# Patient Record
Sex: MALE | Race: OTHER | HISPANIC OR LATINO | Employment: UNEMPLOYED | URBAN - METROPOLITAN AREA
[De-identification: names, ages, dates, MRNs, and addresses within clinical notes are randomized per-mention and may not be internally consistent; named-entity substitution may affect disease eponyms.]

---

## 2017-12-01 ENCOUNTER — APPOINTMENT (EMERGENCY)
Dept: RADIOLOGY | Facility: HOSPITAL | Age: 57
DRG: 872 | End: 2017-12-01
Payer: COMMERCIAL

## 2017-12-01 ENCOUNTER — HOSPITAL ENCOUNTER (INPATIENT)
Facility: HOSPITAL | Age: 57
LOS: 3 days | Discharge: HOME | DRG: 872 | End: 2017-12-04
Attending: EMERGENCY MEDICINE | Admitting: INTERNAL MEDICINE
Payer: COMMERCIAL

## 2017-12-01 ENCOUNTER — APPOINTMENT (INPATIENT)
Dept: RADIOLOGY | Facility: HOSPITAL | Age: 57
DRG: 872 | End: 2017-12-01
Payer: COMMERCIAL

## 2017-12-01 DIAGNOSIS — Z72.0 TOBACCO ABUSE: ICD-10-CM

## 2017-12-01 DIAGNOSIS — A41.9 SEPSIS (HCC): ICD-10-CM

## 2017-12-01 DIAGNOSIS — R10.9 ABDOMINAL PAIN: ICD-10-CM

## 2017-12-01 DIAGNOSIS — K81.9 CHOLECYSTITIS: Primary | ICD-10-CM

## 2017-12-01 PROBLEM — D64.9 ANEMIA: Status: ACTIVE | Noted: 2017-12-01

## 2017-12-01 PROBLEM — C18.9 COLON CANCER METASTASIZED TO LIVER (HCC): Status: ACTIVE | Noted: 2017-12-01

## 2017-12-01 PROBLEM — C78.7 COLON CANCER METASTASIZED TO LIVER (HCC): Status: ACTIVE | Noted: 2017-12-01

## 2017-12-01 PROBLEM — D72.829 LEUKOCYTOSIS: Status: ACTIVE | Noted: 2017-12-01

## 2017-12-01 LAB
ALBUMIN SERPL BCP-MCNC: 2.7 G/DL (ref 3.5–5)
ALP SERPL-CCNC: 390 U/L (ref 46–116)
ALT SERPL W P-5'-P-CCNC: 18 U/L (ref 12–78)
ANION GAP BLD CALC-SCNC: 17 MMOL/L (ref 4–13)
ANION GAP SERPL CALCULATED.3IONS-SCNC: 8 MMOL/L (ref 4–13)
APTT PPP: 47 SECONDS (ref 23–35)
AST SERPL W P-5'-P-CCNC: 81 U/L (ref 5–45)
BACTERIA UR QL AUTO: NORMAL /HPF
BASOPHILS # BLD AUTO: 0.03 THOUSANDS/ΜL (ref 0–0.1)
BASOPHILS NFR BLD AUTO: 0 % (ref 0–1)
BILIRUB SERPL-MCNC: 0.98 MG/DL (ref 0.2–1)
BILIRUB UR QL STRIP: ABNORMAL
BUN BLD-MCNC: 5 MG/DL (ref 5–25)
BUN SERPL-MCNC: 7 MG/DL (ref 5–25)
CA-I BLD-SCNC: 1.08 MMOL/L (ref 1.12–1.32)
CALCIUM SERPL-MCNC: 9 MG/DL (ref 8.3–10.1)
CHLORIDE BLD-SCNC: 98 MMOL/L (ref 100–108)
CHLORIDE SERPL-SCNC: 100 MMOL/L (ref 100–108)
CLARITY UR: CLEAR
CO2 SERPL-SCNC: 25 MMOL/L (ref 21–32)
COLOR UR: YELLOW
CREAT BLD-MCNC: 0.6 MG/DL (ref 0.6–1.3)
CREAT SERPL-MCNC: 0.7 MG/DL (ref 0.6–1.3)
EOSINOPHIL # BLD AUTO: 0.16 THOUSAND/ΜL (ref 0–0.61)
EOSINOPHIL NFR BLD AUTO: 1 % (ref 0–6)
ERYTHROCYTE [DISTWIDTH] IN BLOOD BY AUTOMATED COUNT: 19.8 % (ref 11.6–15.1)
GFR SERPL CREATININE-BSD FRML MDRD: 105 ML/MIN/1.73SQ M
GFR SERPL CREATININE-BSD FRML MDRD: 112 ML/MIN/1.73SQ M
GGT SERPL-CCNC: 1201 U/L (ref 5–85)
GLUCOSE SERPL-MCNC: 92 MG/DL (ref 65–140)
GLUCOSE SERPL-MCNC: 98 MG/DL (ref 65–140)
GLUCOSE UR STRIP-MCNC: NEGATIVE MG/DL
HCT VFR BLD AUTO: 28.4 % (ref 36.5–49.3)
HCT VFR BLD CALC: 30 % (ref 36.5–49.3)
HGB BLD-MCNC: 8.7 G/DL (ref 12–17)
HGB BLDA-MCNC: 10.2 G/DL (ref 12–17)
HGB UR QL STRIP.AUTO: NEGATIVE
HYALINE CASTS #/AREA URNS LPF: NORMAL /LPF
INR PPP: 1.29 (ref 0.86–1.16)
KETONES UR STRIP-MCNC: NEGATIVE MG/DL
LACTATE SERPL-SCNC: 1.9 MMOL/L (ref 0.5–2)
LEUKOCYTE ESTERASE UR QL STRIP: NEGATIVE
LYMPHOCYTES # BLD AUTO: 1.12 THOUSANDS/ΜL (ref 0.6–4.47)
LYMPHOCYTES NFR BLD AUTO: 6 % (ref 14–44)
MCH RBC QN AUTO: 23.3 PG (ref 26.8–34.3)
MCHC RBC AUTO-ENTMCNC: 30.6 G/DL (ref 31.4–37.4)
MCV RBC AUTO: 76 FL (ref 82–98)
MONOCYTES # BLD AUTO: 2.24 THOUSAND/ΜL (ref 0.17–1.22)
MONOCYTES NFR BLD AUTO: 12 % (ref 4–12)
NEUTROPHILS # BLD AUTO: 15.9 THOUSANDS/ΜL (ref 1.85–7.62)
NEUTS SEG NFR BLD AUTO: 81 % (ref 43–75)
NITRITE UR QL STRIP: NEGATIVE
NON-SQ EPI CELLS URNS QL MICRO: NORMAL /HPF
NRBC BLD AUTO-RTO: 0 /100 WBCS
PCO2 BLD: 25 MMOL/L (ref 21–32)
PH UR STRIP.AUTO: 6.5 [PH] (ref 4.5–8)
PLATELET # BLD AUTO: 222 THOUSANDS/UL (ref 149–390)
PMV BLD AUTO: 9.5 FL (ref 8.9–12.7)
POTASSIUM BLD-SCNC: 3.7 MMOL/L (ref 3.5–5.3)
POTASSIUM SERPL-SCNC: 3.7 MMOL/L (ref 3.5–5.3)
PROT SERPL-MCNC: 7.9 G/DL (ref 6.4–8.2)
PROT UR STRIP-MCNC: ABNORMAL MG/DL
PROTHROMBIN TIME: 16.2 SECONDS (ref 12.1–14.4)
RBC # BLD AUTO: 3.74 MILLION/UL (ref 3.88–5.62)
RBC #/AREA URNS AUTO: NORMAL /HPF
SODIUM BLD-SCNC: 135 MMOL/L (ref 136–145)
SODIUM SERPL-SCNC: 133 MMOL/L (ref 136–145)
SP GR UR STRIP.AUTO: 1.01 (ref 1–1.03)
SPECIMEN SOURCE: ABNORMAL
SPECIMEN SOURCE: NORMAL
TROPONIN I BLD-MCNC: 0 NG/ML (ref 0–0.08)
UROBILINOGEN UR QL STRIP.AUTO: 2 E.U./DL
WBC # BLD AUTO: 19.5 THOUSAND/UL (ref 4.31–10.16)
WBC #/AREA URNS AUTO: NORMAL /HPF

## 2017-12-01 PROCEDURE — 96375 TX/PRO/DX INJ NEW DRUG ADDON: CPT

## 2017-12-01 PROCEDURE — 80047 BASIC METABLC PNL IONIZED CA: CPT

## 2017-12-01 PROCEDURE — 36415 COLL VENOUS BLD VENIPUNCTURE: CPT | Performed by: EMERGENCY MEDICINE

## 2017-12-01 PROCEDURE — 81002 URINALYSIS NONAUTO W/O SCOPE: CPT | Performed by: EMERGENCY MEDICINE

## 2017-12-01 PROCEDURE — 80053 COMPREHEN METABOLIC PANEL: CPT | Performed by: EMERGENCY MEDICINE

## 2017-12-01 PROCEDURE — 85025 COMPLETE CBC W/AUTO DIFF WBC: CPT | Performed by: EMERGENCY MEDICINE

## 2017-12-01 PROCEDURE — 76705 ECHO EXAM OF ABDOMEN: CPT

## 2017-12-01 PROCEDURE — 93005 ELECTROCARDIOGRAM TRACING: CPT | Performed by: EMERGENCY MEDICINE

## 2017-12-01 PROCEDURE — 82977 ASSAY OF GGT: CPT | Performed by: INTERNAL MEDICINE

## 2017-12-01 PROCEDURE — 96365 THER/PROPH/DIAG IV INF INIT: CPT

## 2017-12-01 PROCEDURE — 87040 BLOOD CULTURE FOR BACTERIA: CPT | Performed by: EMERGENCY MEDICINE

## 2017-12-01 PROCEDURE — 96367 TX/PROPH/DG ADDL SEQ IV INF: CPT

## 2017-12-01 PROCEDURE — 84484 ASSAY OF TROPONIN QUANT: CPT

## 2017-12-01 PROCEDURE — 96361 HYDRATE IV INFUSION ADD-ON: CPT

## 2017-12-01 PROCEDURE — 71020 HB CHEST X-RAY 2VW FRONTAL&LATL: CPT

## 2017-12-01 PROCEDURE — 85730 THROMBOPLASTIN TIME PARTIAL: CPT | Performed by: EMERGENCY MEDICINE

## 2017-12-01 PROCEDURE — 83605 ASSAY OF LACTIC ACID: CPT | Performed by: EMERGENCY MEDICINE

## 2017-12-01 PROCEDURE — 85610 PROTHROMBIN TIME: CPT | Performed by: EMERGENCY MEDICINE

## 2017-12-01 PROCEDURE — 74177 CT ABD & PELVIS W/CONTRAST: CPT

## 2017-12-01 PROCEDURE — 99285 EMERGENCY DEPT VISIT HI MDM: CPT

## 2017-12-01 PROCEDURE — 81001 URINALYSIS AUTO W/SCOPE: CPT

## 2017-12-01 PROCEDURE — 85014 HEMATOCRIT: CPT

## 2017-12-01 RX ORDER — ACETAMINOPHEN 325 MG/1
650 TABLET ORAL ONCE
Status: COMPLETED | OUTPATIENT
Start: 2017-12-01 | End: 2017-12-01

## 2017-12-01 RX ORDER — ACETAMINOPHEN 325 MG/1
975 TABLET ORAL EVERY 6 HOURS PRN
Status: DISCONTINUED | OUTPATIENT
Start: 2017-12-01 | End: 2017-12-04 | Stop reason: HOSPADM

## 2017-12-01 RX ORDER — NICOTINE 21 MG/24HR
1 PATCH, TRANSDERMAL 24 HOURS TRANSDERMAL DAILY
Status: DISCONTINUED | OUTPATIENT
Start: 2017-12-01 | End: 2017-12-04 | Stop reason: HOSPADM

## 2017-12-01 RX ORDER — OXYCODONE HYDROCHLORIDE AND ACETAMINOPHEN 5; 325 MG/1; MG/1
1 TABLET ORAL EVERY 4 HOURS PRN
COMMUNITY
End: 2017-12-04 | Stop reason: HOSPADM

## 2017-12-01 RX ORDER — OXYCODONE HYDROCHLORIDE 10 MG/1
10 TABLET ORAL EVERY 4 HOURS PRN
Status: DISCONTINUED | OUTPATIENT
Start: 2017-12-01 | End: 2017-12-04 | Stop reason: HOSPADM

## 2017-12-01 RX ORDER — BACLOFEN 20 MG/1
10 TABLET ORAL 3 TIMES DAILY
Status: ON HOLD | COMMUNITY
End: 2017-12-04

## 2017-12-01 RX ORDER — LEVOTHYROXINE SODIUM 0.1 MG/1
100 TABLET ORAL
Status: DISCONTINUED | OUTPATIENT
Start: 2017-12-02 | End: 2017-12-04 | Stop reason: HOSPADM

## 2017-12-01 RX ORDER — ACETAMINOPHEN 650 MG/1
650 SUPPOSITORY RECTAL ONCE
Status: DISCONTINUED | OUTPATIENT
Start: 2017-12-01 | End: 2017-12-01

## 2017-12-01 RX ORDER — VANCOMYCIN HYDROCHLORIDE 1 G/200ML
15 INJECTION, SOLUTION INTRAVENOUS ONCE
Status: COMPLETED | OUTPATIENT
Start: 2017-12-01 | End: 2017-12-01

## 2017-12-01 RX ORDER — BACLOFEN 10 MG/1
10 TABLET ORAL 3 TIMES DAILY
Status: DISCONTINUED | OUTPATIENT
Start: 2017-12-01 | End: 2017-12-04 | Stop reason: HOSPADM

## 2017-12-01 RX ORDER — ONDANSETRON 2 MG/ML
4 INJECTION INTRAMUSCULAR; INTRAVENOUS EVERY 4 HOURS PRN
Status: DISCONTINUED | OUTPATIENT
Start: 2017-12-01 | End: 2017-12-04 | Stop reason: HOSPADM

## 2017-12-01 RX ORDER — FAMOTIDINE 20 MG/1
20 TABLET, FILM COATED ORAL 2 TIMES DAILY
COMMUNITY
End: 2017-12-04 | Stop reason: HOSPADM

## 2017-12-01 RX ORDER — LEVOTHYROXINE SODIUM 0.1 MG/1
100 TABLET ORAL DAILY
Status: ON HOLD | COMMUNITY
End: 2017-12-04

## 2017-12-01 RX ORDER — ONDANSETRON 2 MG/ML
4 INJECTION INTRAMUSCULAR; INTRAVENOUS ONCE
Status: COMPLETED | OUTPATIENT
Start: 2017-12-01 | End: 2017-12-01

## 2017-12-01 RX ORDER — OMEPRAZOLE 20 MG/1
20 CAPSULE, DELAYED RELEASE ORAL DAILY
Status: ON HOLD | COMMUNITY
End: 2017-12-04

## 2017-12-01 RX ORDER — SODIUM CHLORIDE 9 MG/ML
100 INJECTION, SOLUTION INTRAVENOUS CONTINUOUS
Status: DISCONTINUED | OUTPATIENT
Start: 2017-12-01 | End: 2017-12-04

## 2017-12-01 RX ADMIN — METRONIDAZOLE 500 MG: 500 INJECTION, SOLUTION INTRAVENOUS at 13:18

## 2017-12-01 RX ADMIN — ENOXAPARIN SODIUM 40 MG: 40 INJECTION SUBCUTANEOUS at 16:50

## 2017-12-01 RX ADMIN — ONDANSETRON 4 MG: 2 INJECTION INTRAMUSCULAR; INTRAVENOUS at 11:21

## 2017-12-01 RX ADMIN — ACETAMINOPHEN 650 MG: 325 TABLET, FILM COATED ORAL at 11:27

## 2017-12-01 RX ADMIN — METRONIDAZOLE 500 MG: 500 INJECTION, SOLUTION INTRAVENOUS at 20:00

## 2017-12-01 RX ADMIN — SODIUM CHLORIDE 1000 ML: 0.9 INJECTION, SOLUTION INTRAVENOUS at 11:20

## 2017-12-01 RX ADMIN — HYDROMORPHONE HYDROCHLORIDE 0.5 MG: 1 INJECTION, SOLUTION INTRAMUSCULAR; INTRAVENOUS; SUBCUTANEOUS at 21:27

## 2017-12-01 RX ADMIN — SODIUM CHLORIDE 100 ML/HR: 0.9 INJECTION, SOLUTION INTRAVENOUS at 16:45

## 2017-12-01 RX ADMIN — OXYCODONE HYDROCHLORIDE 10 MG: 10 TABLET ORAL at 19:53

## 2017-12-01 RX ADMIN — CEFEPIME HYDROCHLORIDE 2000 MG: 2 INJECTION, SOLUTION INTRAVENOUS at 13:01

## 2017-12-01 RX ADMIN — HYDROMORPHONE HYDROCHLORIDE 0.5 MG: 1 INJECTION, SOLUTION INTRAMUSCULAR; INTRAVENOUS; SUBCUTANEOUS at 15:53

## 2017-12-01 RX ADMIN — BACLOFEN 10 MG: 10 TABLET ORAL at 16:50

## 2017-12-01 RX ADMIN — SODIUM CHLORIDE 1000 ML: 0.9 INJECTION, SOLUTION INTRAVENOUS at 11:57

## 2017-12-01 RX ADMIN — HYDROMORPHONE HYDROCHLORIDE 0.5 MG: 1 INJECTION, SOLUTION INTRAMUSCULAR; INTRAVENOUS; SUBCUTANEOUS at 11:20

## 2017-12-01 RX ADMIN — IOHEXOL 100 ML: 350 INJECTION, SOLUTION INTRAVENOUS at 12:37

## 2017-12-01 RX ADMIN — NICOTINE 1 PATCH: 21 PATCH, EXTENDED RELEASE TRANSDERMAL at 16:50

## 2017-12-01 RX ADMIN — SODIUM CHLORIDE 1000 ML: 0.9 INJECTION, SOLUTION INTRAVENOUS at 16:45

## 2017-12-01 RX ADMIN — BACLOFEN 10 MG: 10 TABLET ORAL at 20:00

## 2017-12-01 RX ADMIN — VANCOMYCIN HYDROCHLORIDE 1000 MG: 1 INJECTION, SOLUTION INTRAVENOUS at 14:30

## 2017-12-01 NOTE — ED ATTENDING ATTESTATION
Candice Suarez MD, saw and evaluated the patient  I have discussed the patient with the resident/non-physician practitioner and agree with the resident's/non-physician practitioner's findings, Plan of Care, and MDM as documented in the resident's/non-physician practitioner's note, except where noted  All available labs and Radiology studies were reviewed  At this point I agree with the current assessment done in the Emergency Department  I have conducted an independent evaluation of this patient a history and physical is as follows:      Critical Care Time  CritCare Time  OA: 66 y/o m with history of colon cancer with last chemotherapy ~ 1 month ago and recently arrived to the [address] [place] [address],[address] from [place] c/o abdominal pain with n/v  + constipation x 1-2 days  No cp but with SOB and FINE over the past few days with increase in abdominal pain  No dizziness  No headache  Previous partial colectomy per daughter  PE, chronically ill appearing m febrile, tachycardic, NC/AT, mildly icteric sclera, mildly dry MM, neck supple/FROM, RR, lungs decreased at b/l bases otherwise CTAb, abd soft, + mild distension, diffusely ttp without r/g, well healed abdominal scars, trace b/l LE edema, intact distal pulses and capillary refill < 2 sec, AAOx3   A/p febrile with abd pain in setting of colon cancer, concern for worsening disease with infectious component, labs, u/a, imaging, IVF hydration, pain control and anti-emetic, treat accordingly, will require admission

## 2017-12-01 NOTE — PROGRESS NOTES
[facility] Senior Admission Note   Unit/Bed # @DBLINK (QRO,94198)@ Encounter: [id number]  [id number] [facility] 65 y o  male [id number]       Patient seen and examined  Reviewed H&P per Dr Johnson Rom  Agree with the assessment and plan  Assessment/Plan: Principal Problem:    Sepsis ([facility] )  Active Problems:    Cholecystitis    [facility]    Anemia    Leukocytosis       Disposition:  INPATIENT     Expected LOS: >2 Midnights  This will be reassessed based on clinical progress        Joshua Asif

## 2017-12-01 NOTE — SEPSIS NOTE
Sepsis Note   George De Santiago 65 y o  male MRN: [id number]  Unit/Bed#: [facility] Encounter: [id number]            Initial Sepsis Screening     Row Name 12/01/17 1202                Is the patient's history suggestive of a new or worsening infection? (!)  Yes (Proceed)  -EL        Suspected source of infection urinary tract infection;acute abdominal infection;pneumonia  -EL        Are two or more of the following signs & symptoms of infection both present and new to the patient? (!)  Yes (Proceed)  -EL        Indicate SIRS criteria Hyperthemia > 38 3C (100 9F); Tachycardia > 90 bpm;Leukocytosis (WBC > 218[address] IJL)  -EL        If the answer is yes to both questions, suspicion of sepsis is present          If severe sepsis is present AND tissue hypoperfusion perists in the hour after fluid resuscitation or lactate > 4, the patient meets criteria for SEPTIC SHOCK          Are any of the following organ dysfunction criteria present within 6 hours of suspected infection and SIRS criteria that are NOT considered to be chronic conditions? (!)  Yes  -EL        Organ dysfunction          Date of presentation of severe sepsis          Time of presentation of severe sepsis          Tissue hypoperfusion persists in the hour after crystalloid fluid administration, evidenced, by either:          Was hypotension present within one hour of the conclusion of crystalloid fluid administration?           Date of presentation of septic shock          Time of presentation of septic shock            User Key  (r) = Recorded By, (t) = Taken By, (c) = Cosigned By    [address] Name Provider Adelso Pa MD Resident

## 2017-12-01 NOTE — SEPSIS NOTE
Sepsis Note   Lindsey Ascencio 65 y o  male MRN: [id number]  Unit/Bed#: [facility] 626-01 Encounter: [id number]            Initial Sepsis Screening     Row Name 12/01/17 1202                Is the patient's history suggestive of a new or worsening infection? (!)  Yes (Proceed)  -EL        Suspected source of infection urinary tract infection;acute abdominal infection;pneumonia  -EL        Are two or more of the following signs & symptoms of infection both present and new to the patient? (!)  Yes (Proceed)  -EL        Indicate SIRS criteria Hyperthemia > 38 3C (100 9F); Tachycardia > 90 bpm;Leukocytosis (WBC > 65302 IJL)  -EL        If the answer is yes to both questions, suspicion of sepsis is present          If severe sepsis is present AND tissue hypoperfusion perists in the hour after fluid resuscitation or lactate > 4, the patient meets criteria for SEPTIC SHOCK          Are any of the following organ dysfunction criteria present within 6 hours of suspected infection and SIRS criteria that are NOT considered to be chronic conditions? (!)  Yes  -EL        Organ dysfunction          Date of presentation of severe sepsis          Time of presentation of severe sepsis          Tissue hypoperfusion persists in the hour after crystalloid fluid administration, evidenced, by either:          Was hypotension present within one hour of the conclusion of crystalloid fluid administration?           Date of presentation of septic shock          Time of presentation of septic shock            User Key  (r) = Recorded By, (t) = Taken By, (c) = Cosigned By    [address] Name Provider Type    EL Cesia Chan MD Resident               Default Flowsheet Data (last 720 hours)      Sepsis Reassessment     Row Name 12/01/17 1333                   Volume Status and Tissue Perfusion Post Fluid Resuscitation- Must Document ALL of the Following:    Vital Signs Reviewed Yes  -EL        Cardio Normal S1/S2  -EL        Pulmonary Normal effort  -EL        Capillary Refill Brisk  -EL        Peripheral Pulses Radial;Dorsalis Pedis  -EL        Peripheral Pulse +2  -EL        Dorsalis Pedis +2  -EL        Skin             *OR*   Intensive Monitoring- Must Document Two * of the Following Four *:    Vital Signs Reviewed          * Central Venous Pressure (CVP or RAP)          * Central Venous Oxygen (SVO2, ScvO2 or Oxygen saturation via central catheter)          * Bedside Cardiovascular US in IVC diameter and % collapse          * Passive Leg Raise OR Crystalloid Challenge            User Key  (r) = Recorded By, (t) = Taken By, (c) = Cosigned By    Initials Name Provider Type    RAFAELA Ramirez MD Resident

## 2017-12-01 NOTE — ED PROVIDER NOTES
History  Chief Complaint   Patient presents with    Abdominal Pain     history of colon cancer, recently arrived from [place] , hasnt had any chemo for 4 weeks, + nausea and vomiting     HPI  81-year-old male past history metastatic colon cancer status post partial colectomy with collect colostomy bags with reanastomosis, on chemotherapy but off of it for the last 3 weeks presents with nausea, vomiting, fevers/chills and diffuse abdominal pain  Patient says he has been off his chemotherapy for the last 3 weeks because he recently emigrated from [facility] and has not followed up with any doctors yet  He began last night with sudden onset diffuse abdominal pain as well as nausea, vomiting and subjective fevers  Patient is passing flatus at this time but has not had a bowel movement in the last day  He has not taken anything for symptoms  He denies any other abdominal surgeries in the past   Patient otherwise denies chest pain, shortness of breath, dysuria, weakness, numbness, tingling, speech difficulties  He is full code  Impression/plan 81-year-old male history of metastatic colon cancer on chemo but has not been treated in 3 weeks presents with fevers, chills, diffuse abdominal pain as well as nausea and vomiting  He is febrile as well as tachycardic  Concern for sepsis  We will do a septic workup, get a CT of the abdomen pelvis, treat his symptoms with IV fluids, Tylenol, Zofran, Dilaudid  Will also empirically treated with vancomycin, cefepime, Flagyl  Prior to Admission Medications   Prescriptions Last Dose Informant Patient Reported?  Taking?   baclofen 20 mg tablet Unknown at Unknown time  Yes No   Sig: Take 10 mg by mouth 3 (three) times a day   famotidine (PEPCID) 20 mg tablet Unknown at Unknown time  Yes No   Sig: Take 20 mg by mouth 2 (two) times a day   levothyroxine 100 mcg tablet Unknown at Unknown time  Yes No   Sig: Take 100 mcg by mouth daily   omeprazole (PriLOSEC) 20 mg delayed release capsule Unknown at Unknown time  Yes No   Sig: Take 20 mg by mouth daily   oxyCODONE-acetaminophen (PERCOCET) 5-325 mg per tablet Unknown at Unknown time  Yes No   Sig: Take 1 tablet by mouth every 4 (four) hours as needed for moderate pain      Facility-Administered Medications: None       Past Medical History:   Diagnosis Date    [facility])     Colon cancer metastasized to liver ([address] )     Disease of thyroid gland        Past Surgical History:   Procedure Laterality Date    COLECTOMY BRET         Family History   Problem Relation Age of Onset    Hypertension Mother      I have reviewed and agree with the history as documented  Social History   Substance Use Topics    Smoking status: Current Every Day Smoker     Packs/day: 1 00     Years: 30 00     Types: Cigarettes    Smokeless tobacco: Never Used    Alcohol use No        Review of Systems   Constitutional: Positive for activity change, appetite change, chills and fever  Negative for diaphoresis and fatigue  HENT: Negative for trouble swallowing and voice change  Eyes: Negative for photophobia  Respiratory: Negative for shortness of breath  Cardiovascular: Negative for chest pain, palpitations and leg swelling  Gastrointestinal: Positive for abdominal pain, constipation and nausea  Negative for diarrhea and vomiting  Endocrine: Negative for polyuria  Genitourinary: Negative for dysuria  Musculoskeletal: Negative for back pain  Skin: Negative for rash  Allergic/Immunologic: Negative  Neurological: Negative for dizziness, tremors, seizures, syncope, facial asymmetry, speech difficulty, weakness, light-headedness, numbness and headaches  Hematological: Negative for adenopathy  Does not bruise/bleed easily  Psychiatric/Behavioral: Negative for agitation         Physical Exam  ED Triage Vitals   Temperature Pulse Respirations Blood Pressure SpO2   12/01/17 1026 12/01/17 1026 12/01/17 1026 12/01/17 1026 12/01/17 1026   99 8 °F (37 7 °C) (!) 124 20 114/62 99 %      Temp Source Heart Rate Source Patient Position - Orthostatic VS BP Location FiO2 (%)   12/01/17 1026 12/01/17 1146 12/01/17 1146 -- --   Oral Monitor Lying        Pain Score       12/01/17 1026       8           Orthostatic Vital Signs  Vitals:    12/01/17 1300 12/01/17 1400 12/01/17 1555 12/01/17 1730   BP: 124/68 119/69 134/73 138/83   Pulse: (!) 111 105 101 (!) 106   Patient Position - Orthostatic VS: Lying Lying  Lying       Physical Exam   Constitutional: He is oriented to person, place, and time  He appears well-developed and well-nourished  No distress  HENT:   Head: Normocephalic and atraumatic  Right Ear: No hemotympanum  Left Ear: No hemotympanum  Nose: No nasal septal hematoma  Mouth/Throat: Uvula is midline and oropharynx is clear and moist  No oropharyngeal exudate  Eyes: EOM are normal  Pupils are equal, round, and reactive to light  Right eye exhibits no discharge  Left eye exhibits no discharge  Neck: Normal range of motion  Neck supple  No JVD present  No tracheal deviation present  Cardiovascular: Normal rate, regular rhythm, normal heart sounds and intact distal pulses  Exam reveals no gallop and no friction rub  No murmur heard  Pulmonary/Chest: Effort normal and breath sounds normal  No stridor  No respiratory distress  He has no wheezes  He has no rales  He exhibits no tenderness  Abdominal: Soft  Bowel sounds are normal  He exhibits no distension and no mass  There is tenderness (diffuse)  There is no rebound and no guarding  No hernia  Musculoskeletal: Normal range of motion  He exhibits no edema  Lymphadenopathy:     He has no cervical adenopathy  Neurological: He is alert and oriented to person, place, and time  He has normal strength and normal reflexes  He is not disoriented  No cranial nerve deficit or sensory deficit  GCS eye subscore is 4  GCS verbal subscore is 5  GCS motor subscore is 6     Reflex Scores: Patellar reflexes are 2+ on the right side and 2+ on the left side  Achilles reflexes are 2+ on the right side and 2+ on the left side  Cn 2-12 grossly intact  No pronator drift  Normal gait  Normal strength/sensation   Skin: Skin is warm and dry  Capillary refill takes less than 2 seconds  No rash noted  He is not diaphoretic  No erythema  No pallor  Psychiatric: He has a normal mood and affect  Nursing note and vitals reviewed        ED Medications  Medications   HYDROmorphone (DILAUDID) 1 mg/mL injection 0 5 mg (0 5 mg Intravenous Given 12/1/17 1553)   levothyroxine tablet 100 mcg (not administered)   baclofen tablet 10 mg (10 mg Oral Given 12/1/17 1650)   nicotine (NICODERM CQ) 21 mg/24 hr TD 24 hr patch 1 patch (1 patch Transdermal Medication Applied 12/1/17 1650)   enoxaparin (LOVENOX) subcutaneous injection 40 mg (40 mg Subcutaneous Given 12/1/17 1650)   ondansetron (ZOFRAN) injection 4 mg (not administered)   oxyCODONE (ROXICODONE) immediate release tablet 10 mg (10 mg Oral Given 12/1/17 1953)   acetaminophen (TYLENOL) tablet 975 mg (not administered)   cefepime (MAXIPIME) 2,000 mg in dextrose 5 % 50 mL IVPB (not administered)   metroNIDAZOLE (FLAGYL) IVPB (premix) 500 mg (not administered)   sodium chloride 0 9 % infusion (100 mL/hr Intravenous New Bag 12/1/17 1645)   sodium chloride 0 9 % bolus 1,000 mL (0 mL Intravenous Stopped 12/1/17 1157)   HYDROmorphone (DILAUDID) 1 mg/mL injection 0 5 mg (0 5 mg Intravenous Given 12/1/17 1120)   ondansetron (ZOFRAN) injection 4 mg (4 mg Intravenous Given 12/1/17 1121)   acetaminophen (TYLENOL) tablet 650 mg (650 mg Oral Given 12/1/17 1127)   metroNIDAZOLE (FLAGYL) IVPB (premix) 500 mg (0 mg Intravenous Stopped 12/1/17 1430)   vancomycin (VANCOCIN) IVPB (premix) 1,000 mg (0 mg/kg × 60 8 kg Intravenous Stopped 12/1/17 1550)   cefepime (MAXIPIME) IVPB (premix) 2,000 mg (0 mg Intravenous Stopped 12/1/17 2903)   sodium chloride 0 9 % bolus 1,000 mL (0 mL Intravenous Stopped 12/1/17 1300)   iohexol (OMNIPAQUE) 350 MG/ML injection (MULTI-DOSE) 100 mL (100 mL Intravenous Given 12/1/17 1237)   sodium chloride 0 9 % bolus 1,000 mL (0 mL Intravenous Stopped 12/1/17 1923)       Diagnostic Studies  Results Reviewed     Procedure Component Value Units Date/Time    Gamma GT [[id number]]  (Abnormal) Collected:  12/01/17 1109    Lab Status:  Final result Specimen:  Blood from Arm, Left Updated:  12/01/17 1639     GGT 1,201 (H) U/L     Urine Microscopic [[id number]]  (Normal) Collected:  12/01/17 1151    Lab Status:  Final result Specimen:  Urine from Urine, Clean Catch Updated:  12/01/17 1205     RBC, UA None Seen /hpf      WBC, UA None Seen /hpf      Epithelial Cells None Seen /hpf      Bacteria, UA None Seen /hpf      Hyaline Casts, UA None Seen /lpf     POCT urinalysis dipstick [[id number]]  (Abnormal) Resulted:  12/01/17 1156    Lab Status:  Final result Specimen:  Urine Updated:  12/01/17 1156    ED Urine Macroscopic [[id number]]  (Abnormal) Collected:  12/01/17 1151    Lab Status:  Final result Specimen:  Urine Updated:  12/01/17 1153     Color, UA Yellow     Clarity, UA Clear     pH, UA 6 5     Leukocytes, UA Negative     Nitrite, UA Negative     Protein, UA 30 (1+) (A) mg/dl      Glucose, UA Negative mg/dl      Ketones, UA Negative mg/dl      Urobilinogen, UA 2 0 (A) E U /dl      Bilirubin, UA Interference- unable to analyze (A)     Blood, UA Negative     Specific Gravity, UA 1 015    Narrative:       CLINITEK RESULT    Blood culture #1 [[id number]] Collected:  12/01/17 1141    Lab Status: In process Specimen:  Blood from Arm, Right Updated:  12/01/17 1147    Lactic Acid x2 [[id number]]  (Normal) Collected:  12/01/17 1109    Lab Status:  Final result Specimen:  Blood from Arm, Left Updated:  12/01/17 1145     LACTIC ACID 1 9 mmol/L     Narrative:         Result may be elevated if tourniquet was used during collection      Comprehensive metabolic panel [[id number]]  (Abnormal) Collected:  12/01/17 1109    Lab Status:  Final result Specimen:  Blood from Arm, Left Updated:  12/01/17 1142     Sodium 133 (L) mmol/L      Potassium 3 7 mmol/L      Chloride 100 mmol/L      CO2 25 mmol/L      Anion Gap 8 mmol/L      BUN 7 mg/dL      Creatinine 0 70 mg/dL      Glucose 92 mg/dL      Calcium 9 0 mg/dL      AST 81 (H) U/L      ALT 18 U/L      Alkaline Phosphatase 390 (H) U/L      Total Protein 7 9 g/dL      Albumin 2 7 (L) g/dL      Total Bilirubin 0 98 mg/dL      eGFR 105 ml/min/1 73sq m     Narrative:         National Kidney Disease Education Program recommendations are as follows:  GFR calculation is accurate only with a steady state creatinine  Chronic Kidney disease less than 60 ml/min/1 73 sq  meters  Kidney failure less than 15 ml/min/1 73 sq  meters  APTT [[id number]]  (Abnormal) Collected:  12/01/17 1109    Lab Status:  Final result Specimen:  Blood from Arm, Left Updated:  12/01/17 1140     PTT 47 (H) seconds     Narrative: Therapeutic Heparin Range = 60-90 seconds    Protime-INR [[id number]]  (Abnormal) Collected:  12/01/17 1109    Lab Status:  Final result Specimen:  Blood from Arm, Left Updated:  12/01/17 1140     Protime 16 2 (H) seconds      INR 1 29 (H)    POCT troponin [[id number]]  (Normal) Collected:  12/01/17 1120    Lab Status:  Final result Updated:  12/01/17 1134     POC Troponin I 0 00 ng/ml      Specimen Type VENOUS    Narrative:         [employer] i-Stat handheld analyzer 99% cutoff is > 0 08ng/mL in [facility]    o cTnI 99% cutoff is useful only when applied to patients in the clinical setting of myocardial ischemia  o cTnI 99% cutoff should be interpreted in the context of clinical history, ECG findings and possibly cardiac imaging to establish correct diagnosis  o cTnI 99% cutoff may be suggestive but clearly not indicative of a coronary event without the clinical setting of myocardial ischemia      POCT Chem 8+ [[id number]]  (Abnormal) Collected: 12/01/17 1123    Lab Status:  Final result Updated:  12/01/17 1128     SODIUM, I-STAT 135 (L) mmol/l      Potassium, i-STAT 3 7 mmol/L      Chloride, istat 98 (L) mmol/L      CO2, i-STAT 25 mmol/L      Anion Gap, Istat 17 (H) mmol/L      Calcium, Ionized i-STAT 1 08 (L) mmol/L      BUN, I-STAT 5 mg/dl      Creatinine, i-STAT 0 6 mg/dl      eGFR 112 ml/min/1 73sq m      Glucose, i-STAT 98 mg/dl      Hct, i-STAT 30 (L) %      Hgb, i-STAT 10 2 (L) g/dl      Specimen Type VENOUS    CBC and differential [[id number]]  (Abnormal) Collected:  12/01/17 1109    Lab Status:  Final result Specimen:  Blood from Arm, Left Updated:  12/01/17 1123     WBC 19 50 (H) Thousand/uL      RBC 3 74 (L) Million/uL      Hemoglobin 8 7 (L) g/dL      Hematocrit 28 4 (L) %      MCV 76 (L) fL      MCH 23 3 (L) pg      MCHC 30 6 (L) g/dL      RDW 19 8 (H) %      MPV 9 5 fL      Platelets 783 Thousands/uL      nRBC 0 /100 WBCs      Neutrophils Relative 81 (H) %      Lymphocytes Relative 6 (L) %      Monocytes Relative 12 %      Eosinophils Relative 1 %      Basophils Relative 0 %      Neutrophils Absolute 15 90 (H) Thousands/µL      Lymphocytes Absolute 1 12 Thousands/µL      Monocytes Absolute 2 24 (H) Thousand/µL      Eosinophils Absolute 0 16 Thousand/µL      Basophils Absolute 0 03 Thousands/µL     Blood culture #2 [[id number]] Collected:  12/01/17 1109    Lab Status: In process Specimen:  Blood from Arm, Left Updated:  12/01/17 1116                 US right upper quadrant   Final Result by Ryan Tenorio MD (12/01 1530)         1  Numerous lesions throughout the liver in keeping with known metastatic disease  Mild hepatomegaly also present  2   No sonographic evidence of cholelithiasis or acute cholecystitis  3   Trace ascites adjacent to the gallbladder fossa           Workstation performed: [id number]         XR chest 2 views   ED Interpretation by Kyung Rubio MD (12/01 1302)   Abnormal   LLL infiltrate      Final Result by Pa Guzman MD Wood (12/01 9405)      No acute cardiopulmonary abnormality seen  Limited inspiration  Indeterminate 13 mm lucency in the right humeral head  Consider dedicated workup  Workstation performed: [id number]         CT abdomen pelvis with contrast   Final Result by Shagufta Olmos DO (12/01 1313)   1  Abnormal appearance of the gallbladder, suspicious for acute cholecystitis  Consider follow-up nuclear medicine HIDA scan to rule out the presence of cystic duct obstruction  2   Diffuse abnormal appearance of the liver, most compatible with metastatic disease  3   Abnormal appearing loops of small bowel in the right upper quadrant, likely reactive ileus  I personally discussed this study with Dakota Benton on 12/1/2017 1:13 PM          Workstation performed: [id number]               Procedures  Procedures      Phone Consults  ED Phone Contact    ED Course  ED Course as of Dec 01 1955   Fri Dec 01, 2017   1128 WBC: (!) 19 50   1201 LACTIC ACID: 1 9   1302 CT abdomen pelvis with contrast   1322  Spoke with GI  They will see the patient                          Initial Sepsis Screening     Row Name 12/01/17 1202                Is the patient's history suggestive of a new or worsening infection? (!)  Yes (Proceed)  -EL        Suspected source of infection urinary tract infection;acute abdominal infection;pneumonia  -EL        Are two or more of the following signs & symptoms of infection both present and new to the patient? (!)  Yes (Proceed)  -EL        Indicate SIRS criteria Hyperthemia > 38 3C (100 9F); Tachycardia > 90 bpm;Leukocytosis (WBC > 58[address] IJL)  -EL        If the answer is yes to both questions, suspicion of sepsis is present          If severe sepsis is present AND tissue hypoperfusion perists in the hour after fluid resuscitation or lactate > 4, the patient meets criteria for SEPTIC SHOCK          Are any of the following organ dysfunction criteria present within 6 hours of suspected infection and SIRS criteria that are NOT considered to be chronic conditions? (!)  Yes  -EL        Organ dysfunction          Date of presentation of severe sepsis          Time of presentation of severe sepsis          Tissue hypoperfusion persists in the hour after crystalloid fluid administration, evidenced, by either:          Was hypotension present within one hour of the conclusion of crystalloid fluid administration?         Date of presentation of septic shock          Time of presentation of septic shock            User Key  (r) = Recorded By, (t) = Taken By, (c) = Cosigned By    [address] Name Provider Type    RAFAELA Bean MD Resident           Default Flowsheet Data (last 720 hours)      Sepsis Reassessment     Row Name 12/01/17 1810 12/01/17 1333                Volume Status and Tissue Perfusion Post Fluid Resuscitation- Must Document ALL of the Following:    Vital Signs Reviewed Yes  -JA Yes  -EL       Cardio Normal S1/S2; Regular rate and rhythm  -JA Normal S1/S2  -EL       Pulmonary Normal effort;Clear to auscultation  -JA Normal effort  -EL       Capillary Refill Brisk  -JA Brisk  -EL       Peripheral Pulses Radial  -JA Radial;Dorsalis Pedis  -EL       Peripheral Pulse +2  -JA +2  -EL       Dorsalis Pedis +2  -JA +2  -EL       Skin Normal  -JA            *OR*   Intensive Monitoring- Must Document Two * of the Following Four *:    Vital Signs Reviewed           * Central Venous Pressure (CVP or RAP)           * Central Venous Oxygen (SVO2, ScvO2 or Oxygen saturation via central catheter)           * Bedside Cardiovascular US in IVC diameter and % collapse           * Passive Leg Raise OR Crystalloid Challenge             User Key  (r) = Recorded By, (t) = Taken By, (c) = Cosigned By    Initials Name Provider Type    Loral Riding Resident    RAFAELA Bean MD Resident                [facility]  [facility]    Disposition  Final diagnoses:   Cholecystitis   Sepsis ([facility] [address] ) Abdominal pain     Time reflects when diagnosis was documented in both MDM as applicable and the Disposition within this note     Time User Action Codes Description Comment    12/1/2017  1:37 PM Trina Middlesex T Add [K83 0] Cholangitis     12/1/2017  1:43 PM Trina Middlesex T Add [K81 9] Cholecystitis     12/1/2017  1:49 PM Trina Middlesex T Add [A41 9] Sepsis ([address] )     12/1/2017  1:50 PM Vahe Jani Modify [K81 9] Cholecystitis     12/1/2017  1:50 PM Trina Middlesex T Remove [K83 0] Cholangitis     12/1/2017  1:50 PM Trina Middlesex T Add [R10 9] Abdominal pain       ED Disposition     ED Disposition Condition Comment    Admit  Case was discussed with SOD and the patient's admission status was agreed to be Admission Status: inpatient status to the service of Dr Bobbi Monteiro   Follow-up Information    None       Current Discharge Medication List      CONTINUE these medications which have NOT CHANGED    Details   baclofen 20 mg tablet Take 10 mg by mouth 3 (three) times a day      famotidine (PEPCID) 20 mg tablet Take 20 mg by mouth 2 (two) times a day      levothyroxine 100 mcg tablet Take 100 mcg by mouth daily      omeprazole (PriLOSEC) 20 mg delayed release capsule Take 20 mg by mouth daily      oxyCODONE-acetaminophen (PERCOCET) 5-325 mg per tablet Take 1 tablet by mouth every 4 (four) hours as needed for moderate pain           No discharge procedures on file  ED Provider  Attending physically available and evaluated [facility]  I managed the patient along with the ED Attending      Electronically Signed by         Dionne Cortez MD  Resident  12/01/17 3341

## 2017-12-01 NOTE — CONSULTS
Acute Care Surgery  Consultation  Lindsey Ascencio 65 y o  male MRN: [id number]  Unit/Bed#: CRB Encounter: [id number]    Assessment and Plan:    72-year-old male with:    1  Abdominal pain and fever likely secondary to acute cholecystitis  - Keep NPO except for sips with meds for now  - IV fluid resuscitation    - Broad-spectrum IV antibiotics  - Follow-up blood cultures  - Consider percutaneous cholecystostomy drainage if failure to respond to conservative management with antibiotics  - Based on patient's history of stage IV colon cancer with significant hepatic metastatic disease burden, the patient is not a candidate for cholecystectomy at this time    - P r n  Analgesia  2   Sepsis likely secondary to #1    - Broad-spectrum IV antibiotics  - Follow-up blood cultures  - Management of cholecystitis as above  3   Stage IV colon cancer    - Management per medical service  Above assessment and plan will be discussed with Dr Norma Keller  History of Present Illness   HPI:  Lindsey Ascencio is a 65 y o  male who presents with significant abdominal pain that is worst in the right upper abdomen  He does get intermittent chronic abdominal pain related to his stage IV colon cancer, but over the last 24 hours, his pain has been significantly worse than it usually is and more constant  He has had associated nausea, vomiting, fever, and shaking chills at home  He is a resident of [facility], but is visiting the area for his son's wedding tomorrow  He states that he has been receiving chemotherapy for the colon cancer, but his last treatment was approximately 4 weeks ago due to his physician being on vacation  He notes that he continues to pass flatus but is not moving his bowels since yesterday  He denies any chest pain, cough, shortness of breath, dysuria      Consults    Review of Systems   Constitutional: Positive for activity change (Decreased activity secondary to pain), appetite change (Decreased appetite secondary to pain and nausea), chills, fatigue and fever  HENT: Negative  Negative for congestion and sore throat  Eyes: Negative  Respiratory: Negative  Negative for cough, chest tightness, shortness of breath and wheezing  Cardiovascular: Negative  Negative for chest pain and leg swelling  Gastrointestinal: Positive for abdominal distention, abdominal pain, nausea and vomiting  Negative for constipation and diarrhea  Endocrine: Negative  Genitourinary: Negative  Negative for difficulty urinating, dysuria, enuresis, frequency and hematuria  Musculoskeletal: Negative for back pain and myalgias  Skin: Negative  Negative for color change, pallor, rash and wound  Allergic/Immunologic: Negative  Neurological: Negative  Negative for dizziness, syncope, weakness, light-headedness and headaches  Hematological: Negative  Psychiatric/Behavioral: Negative  Negative for agitation and confusion  Historical Information   Past Medical History:   Diagnosis Date    Cancer [facility])     Colon cancer metastasized to liver ([facility] [address] )     Disease of thyroid gland      History reviewed  No pertinent surgical history  Social History   History   Alcohol Use No     History   Drug Use No     History   Smoking Status    Current Every Day Smoker   Smokeless Tobacco    Never Used     History reviewed  No pertinent family history      Meds/Allergies   all current active meds have been reviewed and current meds:   Current Facility-Administered Medications   Medication Dose Route Frequency    HYDROmorphone (DILAUDID) 1 mg/mL injection 0 5 mg  0 5 mg Intravenous Q4H PRN    vancomycin (VANCOCIN) IVPB (premix) 1,000 mg  15 mg/kg Intravenous Once     Allergies   Allergen Reactions    Mercury     Shrimp (Diagnostic)        Objective   First Vitals:   Blood Pressure: 114/62 (12/01/17 1026)  Pulse: (!) 124 (12/01/17 1026)  Temperature: 99 8 °F (37 7 °C) (12/01/17 1026)  Temp Source: Oral (12/01/17 1026)  Respirations: 20 (12/01/17 1026)  Height: 5' 1" (154 9 cm) (12/01/17 1146)  Weight - Scale: 60 8 kg (134 lb) (12/01/17 1146)  SpO2: 99 % (12/01/17 1026)    Current Vitals:   Blood Pressure: 119/69 (12/01/17 1400)  Pulse: 105 (12/01/17 1400)  Temperature: (!) 101 1 °F (38 4 °C) (12/01/17 1308)  Temp Source: Oral (12/01/17 1308)  Respirations: 20 (12/01/17 1400)  Height: 5' 1" (154 9 cm) (12/01/17 1146)  Weight - Scale: 60 8 kg (134 lb) (12/01/17 1146)  SpO2: 97 % (12/01/17 1400)      Intake/Output Summary (Last 24 hours) at 12/01/17 1446  Last data filed at 12/01/17 1318   Gross per 24 hour   Intake             1050 ml   Output                0 ml   Net             1050 ml       Invasive Devices     Peripheral Intravenous Line            Peripheral IV 12/01/17 Left Antecubital less than 1 day                Physical Exam   Constitutional: He is oriented to person, place, and time  He appears well-developed and well-nourished  He has a sickly appearance  He appears ill  No distress  HENT:   Head: Normocephalic and atraumatic  Mouth/Throat: Mucous membranes are normal    Eyes: Pupils are equal, round, and reactive to light  No icterus  Neck: Trachea normal and normal range of motion  Neck supple  No tracheal deviation present  Cardiovascular: Normal rate, regular rhythm, normal heart sounds and intact distal pulses  Exam reveals no gallop and no friction rub  No murmur heard  Pulses:       Radial pulses are 2+ on the right side, and 2+ on the left side  Dorsalis pedis pulses are 2+ on the right side, and 2+ on the left side  Pulmonary/Chest: Effort normal and breath sounds normal  No accessory muscle usage  No tachypnea  No respiratory distress  He has no decreased breath sounds  He has no wheezes  He has no rhonchi  He has no rales  Abdominal: Soft  Bowel sounds are normal  He exhibits no distension and no mass  There is hepatomegaly   There is generalized tenderness (Tenderness worst in the right upper quadrant with focal rebound and guarding)  There is rebound, guarding and positive Cedillo's sign  Well healed surgical scars in the midline as well as the right mid abdomen consistent with known exploratory laparotomy, colon resection, ostomy and subsequent reversal    Musculoskeletal: Normal range of motion  He exhibits no edema or deformity  Neurological: He is alert and oriented to person, place, and time  GCS eye subscore is 4  GCS verbal subscore is 5  GCS motor subscore is 6  Skin: Skin is warm, dry and intact  No rash noted  He is not diaphoretic  No erythema  No pallor  Psychiatric: He has a normal mood and affect  Nursing note and vitals reviewed  Lab Results:   I have personally reviewed pertinent lab results    , CBC:   Lab Results   Component Value Date    WBC 19 50 (H) 12/01/2017    HGB 10 2 (L) 12/01/2017    HCT 28 4 (L) 12/01/2017    MCV 76 (L) 12/01/2017     12/01/2017    MCH 23 3 (L) 12/01/2017    MCHC 30 6 (L) 12/01/2017    RDW 19 8 (H) 12/01/2017    MPV 9 5 12/01/2017    NRBC 0 12/01/2017   , CMP:   Lab Results   Component Value Date     (L) 12/01/2017    K 3 7 12/01/2017     12/01/2017    CO2 25 12/01/2017    ANIONGAP 8 12/01/2017    BUN 7 12/01/2017    CREATININE 0 70 12/01/2017    GLUCOSE 98 12/01/2017    CALCIUM 9 0 12/01/2017    AST 81 (H) 12/01/2017    ALT 18 12/01/2017    ALKPHOS 390 (H) 12/01/2017    PROT 7 9 12/01/2017    ALBUMIN 2 7 (L) 12/01/2017    BILITOT 0 98 12/01/2017    EGFR 112 12/01/2017   , Coagulation:   Lab Results   Component Value Date    INR 1 29 (H) 12/01/2017   , Urinalysis:   Lab Results   Component Value Date    COLORU Yellow 12/01/2017    CLARITYU Clear 12/01/2017    SPECGRAV 1 015 12/01/2017    PHUR 6 5 12/01/2017    LEUKOCYTESUR Negative 12/01/2017    NITRITE Negative 12/01/2017    PROTEINUA 30 (1+) (A) 12/01/2017    GLUCOSEU Negative 12/01/2017    KETONESU Negative 12/01/2017    BILIRUBINUR Interference- unable to analyze (A) 12/01/2017    BLOODU Negative 12/01/2017   , Amylase: No results found for: AMYLASE, Lipase: No results found for: LIPASE, lactic acid:  1 9  Imaging: I have personally reviewed pertinent reports  and I have personally reviewed pertinent films in PACS  EKG, Pathology, and Other Studies: I have personally reviewed pertinent reports  Counseling / Coordination of Care  Total floor / unit time spent today 45 minutes  Greater than 50% of total time was spent with the patient and / or family counseling and / or coordination of care      Gus Smith PA-C  12/1/2017 2:46 PM

## 2017-12-01 NOTE — H&P
INTERNAL MEDICINE HISTORY AND PHYSICAL  CRB SOD Team B     NAME: Gokul Howe  AGE: 65 y o  SEX: male  : 1960   MRN: [id number]  ENCOUNTER: [id number]    DATE: 2017  TIME: 2:05 PM    Primary Care Physician: No primary care provider on file  Admitting Provider: Concepcion Richey MD    Chief complaint:  Abdominal pain    History of Present Illness     Gokul Howe is a 65 y o  male the past medical history of hypothyroidism, stage IV colon cancer with metastasis to the liver (diagnosed in ) status post hemicolectomy with an ostomy bag with reanastomosis, currently not on chemotherapy presented to the emergency room for evaluation of abdominal pain  Patient is currently visiting from [facility] for the last 3 weeks  Approximately 3 weeks ago patient was hospitalized for acute diverticulitis which required intravenous fluids and antibiotics  Patient was discharged with Augmentin in a stable state  However he says abdominal pain is chronic in nature and waxes and wanes  Throughout the last 2 weeks patient says that he has had progressive malaise, decreased appetite and subjective fevers and chills worsening these last couple days  This morning patient had severe abdominal pain, nausea and 1 episode of nonbloody vomiting and presented to the emergency room for evaluation  Patient says the abdominal pain is located mainly in the right upper quadrant, described as sharp and worsened after meals  Pain describes the pain as sharp and radiates to his right shoulder  Patient takes Percocet from his previous hospitalization, which alleviates the pain  Patient admits to subjective weight loss however has not measured his weight  Patient is a current an everyday smoker since the age of 21yr, approximately 15 cigarettes per day  Patient currently does not drink alcohol  Patient follows up with hematologist and oncologist Irene Garcia  Patient last dose of chemotherapy was 3 weeks ago  Review of Systems   Review of Systems   Constitutional: Positive for activity change, appetite change, chills, fatigue, fever and unexpected weight change  HENT: Negative for sore throat, trouble swallowing and voice change  Cardiovascular: Negative for chest pain, palpitations and leg swelling  Gastrointestinal: Positive for abdominal distention, abdominal pain, nausea and vomiting  Negative for anal bleeding, blood in stool, constipation, diarrhea and rectal pain  Genitourinary: Negative for decreased urine volume and urgency  Skin: Negative for color change and pallor  Neurological: Positive for weakness  Negative for dizziness, light-headedness and numbness  Past Medical History     Past Medical History:   Diagnosis Date    Cancer [facility])     Colon cancer metastasized to liver ([facility] [address] )     Disease of thyroid gland        Past Surgical History   History reviewed  No pertinent surgical history  Social History     History   Alcohol Use No     History   Drug Use No     History   Smoking Status    Current Every Day Smoker   Smokeless Tobacco    Never Used       Family History   History reviewed  No pertinent family history  Medications Prior to Admission     Prior to Admission medications    Medication Sig Start Date End Date Taking?  Authorizing Provider   baclofen 20 mg tablet Take 10 mg by mouth 3 (three) times a day   Yes Historical Provider, MD   famotidine (PEPCID) 20 mg tablet Take 20 mg by mouth 2 (two) times a day   Yes Historical Provider, MD   levothyroxine 100 mcg tablet Take 100 mcg by mouth daily   Yes Historical Provider, MD   omeprazole (PriLOSEC) 20 mg delayed release capsule Take 20 mg by mouth daily   Yes Historical Provider, MD   oxyCODONE-acetaminophen (PERCOCET) 5-325 mg per tablet Take 1 tablet by mouth every 4 (four) hours as needed for moderate pain   Yes Historical Provider, MD       Allergies     Allergies   Allergen Reactions    Mercury     Shrimp (Diagnostic)        Objective     Vitals:    12/01/17 1100 12/01/17 1146 12/01/17 1300 12/01/17 1308   BP: 151/70 157/70 124/68    Pulse: (!) 118 (!) 114 (!) 111    Resp: 21 20 20    Temp: (!) 102 8 °F (39 3 °C)   (!) 101 1 °F (38 4 °C)   TempSrc: Rectal   Oral   SpO2:  99% 97%    Weight:  60 8 kg (134 lb)     Height:  5' 1" (1 549 m)       Body mass index is 25 32 kg/m²  Intake/Output Summary (Last 24 hours) at 12/01/17 1405  Last data filed at 12/01/17 1318   Gross per 24 hour   Intake             1050 ml   Output                0 ml   Net             1050 ml     Invasive Devices     Peripheral Intravenous Line            Peripheral IV 12/01/17 Left Antecubital less than 1 day                Physical Exam  GENERAL: Appears well-developed and well-nourished  Distress secondary to pain, toxic appearing  HEENT: Normocephalic and atraumatic  No scleral icterus  PERRLA  EOMI B/L  No oropharyngeal edema  MM moist    NECK: Neck supple with no lymphadenopathy  Trachea midline  No JVD  CARDIOVASCULAR: S1 and S2 are present  Regular rate and rhythm  No murmurs, rubs, or gallops  RESPIRATORY: CTA B/L, no rales, rhonci or wheezes  Normal respiratory expansion  BREAST: Deferred  ABDOMINAL: Distended, right upper quadrant abdominal tenderness, positive Cedillo sign, left lower quadrant abdominal tenderness  No rebound tenderness  Positive bowel sounds  EXTREMITIES: 2+ DP and PT pulses bilaterally; no cyanosis, clubbing, edema  ROM intact  CAGE x4   /GYN: Deferred  RECTAL: Deferred  BACK: No tenderness to palpation  No gross deformities  NEUROLOGIC: Patient is alert and oriented to person, place, and time  No sensory or motor deficits  CN 2-12 intact  Plantars downgoing bilaterally  Speech fluent  SKIN: Skin is warm and dry  No skin lesions are present  No rashes  PSYCHIATRIC: Normal mood and affect     Lab Results: I have personally reviewed pertinent reports      CBC:   Results from last 7 days  Lab Units 12/01/17  1123 12/01/17  1109   WBC Thousand/uL  --  19 50*   RBC Million/uL  --  3 74*   HEMOGLOBIN g/dL  --  8 7*   I STAT HEMOGLOBIN g/dl 10 2*  --    HEMATOCRIT %  --  28 4*   MCV fL  --  76*   MCH pg  --  23 3*   MCHC g/dL  --  30 6*   RDW %  --  19 8*   MPV fL  --  9 5   PLATELETS Thousands/uL  --  222   NRBC AUTO /100 WBCs  --  0   NEUTROS PCT %  --  81*   LYMPHS PCT %  --  6*   MONOS PCT %  --  12   EOS PCT %  --  1   BASOS PCT %  --  0   NEUTROS ABS Thousands/µL  --  15 90*   LYMPHS ABS Thousands/µL  --  1 12   MONOS ABS Thousand/µL  --  2 24*   EOS ABS Thousand/µL  --  0 16   , Chemistry Profile:   Results from last 7 days  Lab Units 12/01/17  1123 12/01/17  1109   SODIUM mmol/L  --  133*   POTASSIUM mmol/L  --  3 7   CHLORIDE mmol/L  --  100   CO2 mmol/L  --  25   ANION GAP mmol/L  --  8   BUN mg/dL  --  7   CREATININE mg/dL  --  0 70   GLUCOSE RANDOM mg/dL  --  92   GLUCOSE, ISTAT mg/dl 98  --    CALCIUM mg/dL  --  9 0   AST U/L  --  81*   ALT U/L  --  18   ALK PHOS U/L  --  390*   TOTAL PROTEIN g/dL  --  7 9   ALBUMIN g/dL  --  2 7*   BILIRUBIN TOTAL mg/dL  --  0 98   EGFR ml/min/1 73sq m 112 105   , Coagulation Studies:   Results from last 7 days  Lab Units 12/01/17  1109   PROTIME seconds 16 2*   INR  1 29*   PTT seconds 47*   , Cardiac Studies:   Results from last 7 days  Lab Units 12/01/17  1120   POC TROPONIN I  ng/ml 0 00   , Additional Labs:   Results from last 7 days  Lab Units 12/01/17  1109   LACTIC ACID mmol/L 1 9   , iSTAT CHEM 8:   Results from last 7 days  Lab Units 12/01/17  1123   SODIUM, I-STAT mmol/l 135*   ISTAT CHLORIDE mmol/L 98*   CO2, I-STAT mmol/L 25   ANION GAP ISTAT mmol/L 17*   CALCIUM, IONIZED, ISTAT mmol/L 1 08*   BUN, I-STAT mg/dl 5   CREATININE, I-STAT mg/dl 0 6   EGFR ml/min/1 73sq m 112   GLUCOSE, ISTAT mg/dl 98   HEMATOCRIT, I-STAT % 30*   I STAT HEMOGLOBIN g/dl 10 2*   , ABG:   , Toxicology:   , Last A1C/Lipid Panel/Thyroid Panel: No results found for: HGBA1C, TRIG, CHOL, HDL, LDLCALC, [id number], T3FREE, K1IXMBD, FREET4    Imaging: I have personally reviewed pertinent films in PACS  Ct Abdomen Pelvis With Contrast    Result Date: 12/1/2017  Narrative: CT ABDOMEN AND PELVIS WITH IV CONTRAST INDICATION:  Diffuse abdominal pain  History of stage IV colon carcinoma  COMPARISON: None  TECHNIQUE:  CT examination of the abdomen and pelvis was performed  Reformatted images were created in axial, sagittal, and coronal planes  Radiation dose length product (DLP) for this visit:  609 85 mGy-cm   This examination, like all CT scans performed in the [facility], was performed utilizing techniques to minimize radiation dose exposure, including the use of iterative  reconstruction and automated exposure control  IV Contrast:  100 mL of iohexol (OMNIPAQUE)     Enteric Contrast:  Enteric contrast was not administered  FINDINGS: ABDOMEN LOWER CHEST:  No significant abnormalities identified in the lower chest  LIVER/BILIARY TREE: The liver is enlarged  There are multiple low-density lesions throughout the liver, compatible with metastatic disease  Several of the lesions demonstrate focal areas of persistent increased density, suggesting prior chemoembolization  Small amount of subcapsular fluid surrounding the liver  GALLBLADDER: The gallbladder is distended  No calcified gallstones  There is a mild amount of pericholecystic fluid  Moderate amount of pericholecystic inflammatory change  SPLEEN:  Mildly enlarged  PANCREAS:  Unremarkable  ADRENAL GLANDS:  Unremarkable  KIDNEYS/URETERS:  Unremarkable  No hydronephrosis  STOMACH AND BOWEL:  Evaluation of the GI tract is limited due to lack of oral contrast material   Stomach decompressed  Hiatal hernia  There is no obstruction  Mildly dilated, fluid-filled loops of small bowel in the right upper quadrant  There is been prior right hemicolectomy  Anastomosis in the left upper quadrant    The anastomosis appears intact  APPENDIX:  Surgically absent  ABDOMINOPELVIC CAVITY:  No ascites or free intraperitoneal air  Prominent mesenteric and retroperitoneal lymph nodes  No pathologic adenopathy based on CT criteria  VESSELS:  Unremarkable for patient's age  PELVIS REPRODUCTIVE ORGANS:  Prostate gland enlarged and heterogeneous in CT density  Correlation with serum PSA  URINARY BLADDER:  Unremarkable  ABDOMINAL WALL/INGUINAL REGIONS:  Prior colostomy site in the right upper quadrant  Bilateral inguinal hernias  Riding or hernia contains fat  OSSEOUS STRUCTURES:  No acute fracture or destructive osseous lesion  Degenerative changes superior endplate L2, most compatible with Schmorl's node  Impression: 1  Abnormal appearance of the gallbladder, suspicious for acute cholecystitis  Consider follow-up nuclear medicine HIDA scan to rule out the presence of cystic duct obstruction  2   Diffuse abnormal appearance of the liver, most compatible with metastatic disease  3   Abnormal appearing loops of small bowel in the right upper quadrant, likely reactive ileus  I personally discussed this study with Ga Asif on 12/1/2017 1:13 PM  Workstation performed: [id number]       Microbiology: cultures obtained in emergency department include blood cultures    Urinalysis:   Results from last 7 days  Lab Units 12/01/17  1151   COLOR UA  Yellow   CLARITY UA  Clear   SPEC GRAV UA  1 015   PH UA  6 5   LEUKOCYTES UA  Negative   NITRITE UA  Negative   PROTEIN UA mg/dl 30 (1+)*   GLUCOSE UA mg/dl Negative   KETONES UA mg/dl Negative   BILIRUBIN UA  Interference- unable to analyze*   BLOOD UA  Negative        Urine Micro:   Results from last 7 days  Lab Units 12/01/17  1151   RBC UA /hpf None Seen   WBC UA /hpf None Seen   EPITHELIAL CELLS WET PREP /hpf None Seen   BACTERIA UA /hpf None Seen        EKG, Pathology, and Other Studies: I have personally reviewed pertinent reports        Medications given in Emergency Department     Medication Administration - last 24 hours from 11/30/2017 1405 to 12/01/2017 1405       Date/Time Order Dose Route Action Action by     12/01/2017 1157 sodium chloride 0 9 % bolus 1,000 mL 0 mL Intravenous Stopped Antonio Tena RN     12/01/2017 1120 sodium chloride 0 9 % bolus 1,000 mL 1,000 mL Intravenous [facility] Antonio Tena RN     12/01/2017 1120 HYDROmorphone (DILAUDID) 1 mg/mL injection 0 5 mg 0 5 mg Intravenous Given Antonio Tena RN     12/01/2017 1121 ondansetron (ZOFRAN) injection 4 mg 4 mg Intravenous Given Antonio Tena RN     12/01/2017 1114 acetaminophen (TYLENOL) rectal suppository 650 mg 650 mg Rectal Not Given Antonio Tena RN     12/01/2017 1127 acetaminophen (TYLENOL) tablet 650 mg 650 mg Oral Given Antonio Tena RN     12/01/2017 1318 metroNIDAZOLE (FLAGYL) IVPB (premix) 500 mg 500 mg Intravenous [facility] Antonio Tena RN     12/01/2017 1318 cefepime (MAXIPIME) IVPB (premix) 2,000 mg 0 mg Intravenous Stopped Antonio Tena RN     12/01/2017 1301 cefepime (MAXIPIME) IVPB (premix) 2,000 mg 2,000 mg Intravenous [facility] Antonio Tena RN     12/01/2017 1300 sodium chloride 0 9 % bolus 1,000 mL 0 mL Intravenous Stopped Antonio Tena RN     12/01/2017 1157 sodium chloride 0 9 % bolus 1,000 mL 1,000 mL Intravenous [facility] [place] DarinelSAM carroll     12/01/2017 1237 iohexol (OMNIPAQUE) 350 MG/ML injection (MULTI-DOSE) 100 mL 100 mL Intravenous Given Stacia Medel          Assessment and Plan       Sepsis  On admission, patient temp 102 8°,  heart rate 118 and leukocytosis -met sepsis criteria  Source likely from acute cholecystitis  Chest x-ray showed no intrathoracic pathology, UA   Lactic acid 1 9  Blood cultures x 2 pending  Intravenous fluids initiated  Current hemodynamically stable  Received a 1 time dose of vancomycin cefepime in the emergency room  Currently on intravenous cefepime and Flagyl      Acute Cholecystitis  CT of the abdomen and pelvis showed a normal appearance of gallbladder  Right upper quadrant ultrasound showed no sonographic evidence of cholecystitis or acute cholecystitis  There is trace ascites adjacent to the gallbladder and numerous  Surgery consult placed  Maintain NPO  Likely not a surgical candidate secondary to his metastatic disease of the liver  Intravenous Dilaudid and Oxycodone for pain as needed  Continue IV Cefepime and Flagyl  Symptomatic treatment with intravenous Zofran  CMP in the am    Stage IV Colon Cancer with Liver Metastasis  Diagnosed in September 2014 with stage 4  Status post hemicolectomy and ostomy bag with a anastomosis  Status post chemotherapy for 3 weeks with Erbitox, FOLFIRI,   Currently not on any chemotherapy  Follows up with Hematologist/Oncologist Zee Salinas in [facility]  CT abdomen and pelvis-Diffuse abnormal appearance of the liver compatible with metastatic disease  Currently AST 81, ALT 18, PTT 16 2, INR 1 29    Hypothyroidism  Continue Synthroid 100 mcg    Elevated Alkaline Phosphatase    CMP in the morning  GGT pending        Code Status: Level 3 - DNAR/DNI  VTE Pharmacologic Prophylaxis: Sequential compression device (Venodyne)  and Enoxaparin (Lovenox)   VTE Mechanical Prophylaxis: sequential compression device  Admission Status: INPATIENT     Admission Time  I spent 30 minutes admitting the patient  This involved direct patient contact where I performed a full history and physical, reviewing previous records, and reviewing laboratory and other diagnostic studies  Leopoldo Pies, M D  Internal Medicine PGY-1  12/1/2017 2:05 PM  Pager [phone]

## 2017-12-01 NOTE — PLAN OF CARE
PAIN - ADULT     Verbalizes/displays adequate comfort level or baseline comfort level Not Progressing          CARDIOVASCULAR - ADULT     Absence of cardiac dysrhythmias or at baseline rhythm Progressing        DISCHARGE PLANNING     Discharge to home or other facility with appropriate resources Progressing        GASTROINTESTINAL - ADULT     Minimal or absence of nausea and/or vomiting Progressing     Maintains or returns to baseline bowel function Progressing     Maintains adequate nutritional intake Progressing        INFECTION - ADULT     Absence or prevention of progression during hospitalization Progressing        Knowledge Deficit     Patient/family/caregiver demonstrates understanding of disease process, treatment plan, medications, and discharge instructions Progressing        METABOLIC, FLUID AND ELECTROLYTES - ADULT     Electrolytes maintained within normal limits Progressing     Fluid balance maintained Progressing        Nutrition/Hydration-ADULT     Nutrient/Hydration intake appropriate for improving, restoring or maintaining nutritional needs Progressing        Potential for Falls     Patient will remain free of falls Progressing        SAFETY ADULT     Maintain or return to baseline ADL function Progressing     Patient will remain free of falls Progressing        SKIN/TISSUE INTEGRITY - ADULT     Skin integrity remains intact Progressing

## 2017-12-02 LAB
ABO GROUP BLD: NORMAL
ALBUMIN SERPL BCP-MCNC: 2 G/DL (ref 3.5–5)
ALP SERPL-CCNC: 268 U/L (ref 46–116)
ALT SERPL W P-5'-P-CCNC: 12 U/L (ref 12–78)
ANION GAP SERPL CALCULATED.3IONS-SCNC: 10 MMOL/L (ref 4–13)
ANISOCYTOSIS BLD QL SMEAR: PRESENT
AST SERPL W P-5'-P-CCNC: 85 U/L (ref 5–45)
BASOPHILS # BLD MANUAL: 0.19 THOUSAND/UL (ref 0–0.1)
BASOPHILS NFR MAR MANUAL: 1 % (ref 0–1)
BILIRUB SERPL-MCNC: 0.72 MG/DL (ref 0.2–1)
BLD GP AB SCN SERPL QL: NEGATIVE
BUN SERPL-MCNC: 8 MG/DL (ref 5–25)
CALCIUM SERPL-MCNC: 8 MG/DL (ref 8.3–10.1)
CHLORIDE SERPL-SCNC: 108 MMOL/L (ref 100–108)
CO2 SERPL-SCNC: 20 MMOL/L (ref 21–32)
CREAT SERPL-MCNC: 0.59 MG/DL (ref 0.6–1.3)
EOSINOPHIL # BLD MANUAL: 0 THOUSAND/UL (ref 0–0.4)
EOSINOPHIL NFR BLD MANUAL: 0 % (ref 0–6)
ERYTHROCYTE [DISTWIDTH] IN BLOOD BY AUTOMATED COUNT: 19.8 % (ref 11.6–15.1)
GFR SERPL CREATININE-BSD FRML MDRD: 113 ML/MIN/1.73SQ M
GLUCOSE SERPL-MCNC: 74 MG/DL (ref 65–140)
HCT VFR BLD AUTO: 21.9 % (ref 36.5–49.3)
HEMOCCULT STL QL: NEGATIVE
HGB BLD-MCNC: 6.8 G/DL (ref 12–17)
HYPERCHROMIA BLD QL SMEAR: PRESENT
LYMPHOCYTES # BLD AUTO: 0.19 THOUSAND/UL (ref 0.6–4.47)
LYMPHOCYTES # BLD AUTO: 1 % (ref 14–44)
MAGNESIUM SERPL-MCNC: 1.8 MG/DL (ref 1.6–2.6)
MCH RBC QN AUTO: 23.5 PG (ref 26.8–34.3)
MCHC RBC AUTO-ENTMCNC: 31.1 G/DL (ref 31.4–37.4)
MCV RBC AUTO: 76 FL (ref 82–98)
MICROCYTES BLD QL AUTO: PRESENT
MONOCYTES # BLD AUTO: 1.3 THOUSAND/UL (ref 0–1.22)
MONOCYTES NFR BLD: 7 % (ref 4–12)
NEUTROPHILS # BLD MANUAL: 16.84 THOUSAND/UL (ref 1.85–7.62)
NEUTS SEG NFR BLD AUTO: 91 % (ref 43–75)
NRBC BLD AUTO-RTO: 0 /100 WBCS
OVALOCYTES BLD QL SMEAR: PRESENT
PHOSPHATE SERPL-MCNC: 2 MG/DL (ref 2.7–4.5)
PLATELET # BLD AUTO: 180 THOUSANDS/UL (ref 149–390)
PLATELET BLD QL SMEAR: ADEQUATE
PMV BLD AUTO: 8.8 FL (ref 8.9–12.7)
POIKILOCYTOSIS BLD QL SMEAR: PRESENT
POTASSIUM SERPL-SCNC: 3.3 MMOL/L (ref 3.5–5.3)
PROT SERPL-MCNC: 6.3 G/DL (ref 6.4–8.2)
RBC # BLD AUTO: 2.89 MILLION/UL (ref 3.88–5.62)
RBC MORPH BLD: PRESENT
RH BLD: NEGATIVE
SODIUM SERPL-SCNC: 138 MMOL/L (ref 136–145)
SPECIMEN EXPIRATION DATE: NORMAL
WBC # BLD AUTO: 18.51 THOUSAND/UL (ref 4.31–10.16)

## 2017-12-02 PROCEDURE — 84100 ASSAY OF PHOSPHORUS: CPT | Performed by: INTERNAL MEDICINE

## 2017-12-02 PROCEDURE — 86901 BLOOD TYPING SEROLOGIC RH(D): CPT | Performed by: STUDENT IN AN ORGANIZED HEALTH CARE EDUCATION/TRAINING PROGRAM

## 2017-12-02 PROCEDURE — 86920 COMPATIBILITY TEST SPIN: CPT

## 2017-12-02 PROCEDURE — 86900 BLOOD TYPING SEROLOGIC ABO: CPT | Performed by: STUDENT IN AN ORGANIZED HEALTH CARE EDUCATION/TRAINING PROGRAM

## 2017-12-02 PROCEDURE — 86850 RBC ANTIBODY SCREEN: CPT | Performed by: STUDENT IN AN ORGANIZED HEALTH CARE EDUCATION/TRAINING PROGRAM

## 2017-12-02 PROCEDURE — 82272 OCCULT BLD FECES 1-3 TESTS: CPT | Performed by: INTERNAL MEDICINE

## 2017-12-02 PROCEDURE — 80053 COMPREHEN METABOLIC PANEL: CPT | Performed by: INTERNAL MEDICINE

## 2017-12-02 PROCEDURE — 83735 ASSAY OF MAGNESIUM: CPT | Performed by: INTERNAL MEDICINE

## 2017-12-02 PROCEDURE — 85007 BL SMEAR W/DIFF WBC COUNT: CPT | Performed by: INTERNAL MEDICINE

## 2017-12-02 PROCEDURE — 85027 COMPLETE CBC AUTOMATED: CPT | Performed by: INTERNAL MEDICINE

## 2017-12-02 PROCEDURE — P9021 RED BLOOD CELLS UNIT: HCPCS

## 2017-12-02 RX ORDER — DIAPER,BRIEF,INFANT-TODD,DISP
EACH MISCELLANEOUS 4 TIMES DAILY PRN
Status: DISCONTINUED | OUTPATIENT
Start: 2017-12-02 | End: 2017-12-04 | Stop reason: HOSPADM

## 2017-12-02 RX ORDER — FUROSEMIDE 10 MG/ML
20 INJECTION INTRAMUSCULAR; INTRAVENOUS ONCE
Status: COMPLETED | OUTPATIENT
Start: 2017-12-02 | End: 2017-12-02

## 2017-12-02 RX ADMIN — BACLOFEN 10 MG: 10 TABLET ORAL at 15:38

## 2017-12-02 RX ADMIN — BACLOFEN 10 MG: 10 TABLET ORAL at 07:51

## 2017-12-02 RX ADMIN — ACETAMINOPHEN 975 MG: 325 TABLET, FILM COATED ORAL at 20:26

## 2017-12-02 RX ADMIN — CEFEPIME 2000 MG: 2 INJECTION, POWDER, FOR SOLUTION INTRAMUSCULAR; INTRAVENOUS at 00:41

## 2017-12-02 RX ADMIN — HYDROMORPHONE HYDROCHLORIDE 0.5 MG: 1 INJECTION, SOLUTION INTRAMUSCULAR; INTRAVENOUS; SUBCUTANEOUS at 16:42

## 2017-12-02 RX ADMIN — METRONIDAZOLE 500 MG: 500 INJECTION, SOLUTION INTRAVENOUS at 12:35

## 2017-12-02 RX ADMIN — HYDROMORPHONE HYDROCHLORIDE 0.5 MG: 1 INJECTION, SOLUTION INTRAMUSCULAR; INTRAVENOUS; SUBCUTANEOUS at 01:55

## 2017-12-02 RX ADMIN — CEFEPIME 2000 MG: 2 INJECTION, POWDER, FOR SOLUTION INTRAMUSCULAR; INTRAVENOUS at 23:00

## 2017-12-02 RX ADMIN — ACETAMINOPHEN 975 MG: 325 TABLET, FILM COATED ORAL at 09:22

## 2017-12-02 RX ADMIN — NICOTINE 1 PATCH: 21 PATCH, EXTENDED RELEASE TRANSDERMAL at 07:50

## 2017-12-02 RX ADMIN — OXYCODONE HYDROCHLORIDE 10 MG: 10 TABLET ORAL at 07:51

## 2017-12-02 RX ADMIN — OXYCODONE HYDROCHLORIDE 10 MG: 10 TABLET ORAL at 00:48

## 2017-12-02 RX ADMIN — POTASSIUM PHOSPHATE, MONOBASIC AND POTASSIUM PHOSPHATE, DIBASIC 30 MMOL: 224; 236 INJECTION, SOLUTION INTRAVENOUS at 16:42

## 2017-12-02 RX ADMIN — HYDROMORPHONE HYDROCHLORIDE 0.5 MG: 1 INJECTION, SOLUTION INTRAMUSCULAR; INTRAVENOUS; SUBCUTANEOUS at 09:26

## 2017-12-02 RX ADMIN — METRONIDAZOLE 500 MG: 500 INJECTION, SOLUTION INTRAVENOUS at 05:37

## 2017-12-02 RX ADMIN — CEFEPIME 2000 MG: 2 INJECTION, POWDER, FOR SOLUTION INTRAMUSCULAR; INTRAVENOUS at 11:20

## 2017-12-02 RX ADMIN — OXYCODONE HYDROCHLORIDE 10 MG: 10 TABLET ORAL at 21:17

## 2017-12-02 RX ADMIN — BACLOFEN 10 MG: 10 TABLET ORAL at 20:40

## 2017-12-02 RX ADMIN — LEVOTHYROXINE SODIUM 100 MCG: 100 TABLET ORAL at 05:37

## 2017-12-02 RX ADMIN — ENOXAPARIN SODIUM 40 MG: 40 INJECTION SUBCUTANEOUS at 07:52

## 2017-12-02 RX ADMIN — OXYCODONE HYDROCHLORIDE 10 MG: 10 TABLET ORAL at 12:35

## 2017-12-02 RX ADMIN — METRONIDAZOLE 500 MG: 500 INJECTION, SOLUTION INTRAVENOUS at 20:40

## 2017-12-02 RX ADMIN — SODIUM CHLORIDE 100 ML/HR: 0.9 INJECTION, SOLUTION INTRAVENOUS at 05:34

## 2017-12-02 RX ADMIN — FUROSEMIDE 20 MG: 10 INJECTION, SOLUTION INTRAMUSCULAR; INTRAVENOUS at 14:31

## 2017-12-02 NOTE — PROGRESS NOTES
IM Residency Progress Note   Unit/Bed#: [facility] 626-01 Encounter: [id number]  SOD Team B       Valentino Medina 65 y o  male [id number]    Assessment/Plan:    Principal Problem:    Sepsis ([facility] [address] )  Active Problems:    Cholecystitis    Colon cancer metastasized to liver ([facility] [address] )    Anemia    Leukocytosis    Sepsis secondary to acute cholecystitis:   -we will continue with Flagyl and cefepime at this point  Today is antibiotic day 2  He continues to spike fever this morning    -blood cultures are pending    -right upper quadrant ultrasound, chest x-ray, and CT abdomen and pelvis all reviewed  -WBC count 18 51    -I will continue his intravenous fluids  -follow-up blood culture  Anemia: Hgb 6 8 this morning  Decreased from 9 7 on admission     -[place]    -will transfuse with 2 unit PRBC, consent obtained   -etiology is likely multifactorial due to anemia of chronic disease given his history of colon cancer, but will also evaluate for FOBT    Acute cholecystitis:   -surgical input appreciated  No surgical intervention planned at this time    -continue with pain control as ordered  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:   -Diagnosed in September 2014 with stage 4  Status post hemicolectomy and ostomy bag with a anastomosis  Status post chemotherapy for 3 weeks with Erbitox, FOLFIRI,   Currently not on any chemotherapy  Follows up with Hematologist/Oncologist Kendall Wilson in [facility]    Hypothyroidism:  Continue with Synthroid    Elevated alkaline phosphatase: This is likely secondary to his colon cancer with liver metastasis, versus may be related to possible osteolytic lesion in the right humeral head, see below, however the etiology of this lesion is indeterminate and will require further follow-up  R Humeral head lesion:  Patient has a 13 mm lucency in the right humeral head  He will need follow-up for this     -he will need follow-up after acute stage of his illness here  Disposition:  Continue inpatient care       Subjective:   Patient continues to have right upper quadrant abdominal pain, worse after taking deep breath, he denies any associated nausea vomiting, chills, chest pain, shortness of breath, dysuria  He does continue to have loose bowel movements, but denies any melena, hematochezia  Vitals: Temp (24hrs), Av 2 °F (37 9 °C), Min:99 °F (37 2 °C), Max:102 8 °F (39 3 °C)  Current: Temperature: (!) 100 8 °F (38 2 °C)  Vitals:    17 1730 17 2339 17 2342 17 0831   BP: 138/83 123/70 123/70 130/68   Pulse: (!) 106 (!) 106 (!) 106 (!) 113   Resp: 18 18 18 18   Temp: 99 2 °F (37 3 °C) 99 °F (37 2 °C) 99 °F (37 2 °C) (!) 100 8 °F (38 2 °C)   TempSrc: Oral   Oral   SpO2: 99% 97% 97% 97%   Weight: 61 3 kg (135 lb 2 3 oz)      Height: 5' 1" (1 549 m)       Body mass index is 25 53 kg/m²  I/O last 24 hours:   In: 3431 7 [I V :1281 7; IV Piggyback:2150]  Out: -       Physical Exam: General appearance: alert, appears older than stated age, cooperative and no distress  Head: Normocephalic, without obvious abnormality, atraumatic  Eyes: EOMI, no icterus  Lungs: clear to auscultation bilaterally, no rales/rhonchi/wheezes  Heart: +tachy, S1, S2 normal, no murmur, click, rub or gallop  Abdomen: soft, positive Cedillo sign, tenderness to palpation of the right upper quadrant; bowel sounds normal;   Extremities: extremities normal, atraumatic, no cyanosis or edema  Neurologic: CN 2-12 grossly intact, 5/5 UE/LE muscle strength, speech fluent    Invasive Devices     Peripheral Intravenous Line            Peripheral IV 17 Left Antecubital less than 1 day                          Labs:   Recent Results (from the past 24 hour(s))   CBC and differential    Collection Time: 17 11:09 AM   Result Value Ref Range    WBC 19 50 (H) 4 31 - 10 16 Thousand/uL    RBC 3 74 (L) 3 88 - 5 62 Million/uL Hemoglobin 8 7 (L) 12 0 - 17 0 g/dL    Hematocrit 28 4 (L) 36 5 - 49 3 %    MCV 76 (L) 82 - 98 fL    MCH 23 3 (L) 26 8 - 34 3 pg    MCHC 30 6 (L) 31 4 - 37 4 g/dL    RDW 19 8 (H) 11 6 - 15 1 %    MPV 9 5 8 9 - 12 7 fL    Platelets 092 315 - 996 Thousands/uL    nRBC 0 /100 WBCs    Neutrophils Relative 81 (H) 43 - 75 %    Lymphocytes Relative 6 (L) 14 - 44 %    Monocytes Relative 12 4 - 12 %    Eosinophils Relative 1 0 - 6 %    Basophils Relative 0 0 - 1 %    Neutrophils Absolute 15 90 (H) 1 85 - 7 62 Thousands/µL    Lymphocytes Absolute 1 12 0 60 - 4 47 Thousands/µL    Monocytes Absolute 2 24 (H) 0 17 - 1 22 Thousand/µL    Eosinophils Absolute 0 16 0 00 - 0 61 Thousand/µL    Basophils Absolute 0 03 0 00 - 0 10 Thousands/µL   Comprehensive metabolic panel    Collection Time: 12/01/17 11:09 AM   Result Value Ref Range    Sodium 133 (L) 136 - 145 mmol/L    Potassium 3 7 3 5 - 5 3 mmol/L    Chloride 100 100 - 108 mmol/L    CO2 25 21 - 32 mmol/L    Anion Gap 8 4 - 13 mmol/L    BUN 7 5 - 25 mg/dL    Creatinine 0 70 0 60 - 1 30 mg/dL    Glucose 92 65 - 140 mg/dL    Calcium 9 0 8 3 - 10 1 mg/dL    AST 81 (H) 5 - 45 U/L    ALT 18 12 - 78 U/L    Alkaline Phosphatase 390 (H) 46 - 116 U/L    Total Protein 7 9 6 4 - 8 2 g/dL    Albumin 2 7 (L) 3 5 - 5 0 g/dL    Total Bilirubin 0 98 0 20 - 1 00 mg/dL    eGFR 105 ml/min/1 73sq m   Lactic Acid x2    Collection Time: 12/01/17 11:09 AM   Result Value Ref Range    LACTIC ACID 1 9 0 5 - 2 0 mmol/L   Protime-INR    Collection Time: 12/01/17 11:09 AM   Result Value Ref Range    Protime 16 2 (H) 12 1 - 14 4 seconds    INR 1 29 (H) 0 86 - 1 16   APTT    Collection Time: 12/01/17 11:09 AM   Result Value Ref Range    PTT 47 (H) 23 - 35 seconds   Gamma GT    Collection Time: 12/01/17 11:09 AM   Result Value Ref Range    GGT 1,201 (H) 5 - 85 U/L   POCT troponin    Collection Time: 12/01/17 11:20 AM   Result Value Ref Range    POC Troponin I 0 00 0 00 - 0 08 ng/ml    Specimen Type VENOUS    POCT Chem 8+    Collection Time: 12/01/17 11:23 AM   Result Value Ref Range    SODIUM, I-STAT 135 (L) 136 - 145 mmol/l    Potassium, i-STAT 3 7 3 5 - 5 3 mmol/L    Chloride, istat 98 (L) 100 - 108 mmol/L    CO2, i-STAT 25 21 - 32 mmol/L    Anion Gap, Istat 17 (H) 4 - 13 mmol/L    Calcium, Ionized i-STAT 1 08 (L) 1 12 - 1 32 mmol/L    BUN, I-STAT 5 5 - 25 mg/dl    Creatinine, i-STAT 0 6 0 6 - 1 3 mg/dl    eGFR 112 ml/min/1 73sq m    Glucose, i-STAT 98 65 - 140 mg/dl    Hct, i-STAT 30 (L) 36 5 - 49 3 %    Hgb, i-STAT 10 2 (L) 12 0 - 17 0 g/dl    Specimen Type VENOUS    ED Urine Macroscopic    Collection Time: 12/01/17 11:51 AM   Result Value Ref Range    Color, UA Yellow     Clarity, UA Clear     pH, UA 6 5 4 5 - 8 0    Leukocytes, UA Negative Negative    Nitrite, UA Negative Negative    Protein, UA 30 (1+) (A) Negative mg/dl    Glucose, UA Negative Negative mg/dl    Ketones, UA Negative Negative mg/dl    Urobilinogen, UA 2 0 (A) 0 2, 1 0 E U /dl E U /dl    Bilirubin, UA Interference- unable to analyze (A) Negative    Blood, UA Negative Negative    Specific Gravity, UA 1 015 1 003 - 1 030   Urine Microscopic    Collection Time: 12/01/17 11:51 AM   Result Value Ref Range    RBC, UA None Seen None Seen, 0-5 /hpf    WBC, UA None Seen None Seen, 0-5, 5-55, 5-65 /hpf    Epithelial Cells None Seen None Seen, Occasional /hpf    Bacteria, UA None Seen None Seen, Occasional /hpf    Hyaline Casts, UA None Seen None Seen /lpf   CBC and differential    Collection Time: 12/02/17  8:39 AM   Result Value Ref Range    WBC 18 51 (H) 4 31 - 10 16 Thousand/uL    RBC 2 89 (L) 3 88 - 5 62 Million/uL    Hemoglobin 6 8 (LL) 12 0 - 17 0 g/dL    Hematocrit 21 9 (L) 36 5 - 49 3 %    MCV 76 (L) 82 - 98 fL    MCH 23 5 (L) 26 8 - 34 3 pg    MCHC 31 1 (L) 31 4 - 37 4 g/dL    RDW 19 8 (H) 11 6 - 15 1 %    MPV 8 8 (L) 8 9 - 12 7 fL    Platelets 244 229 - 375 Thousands/uL    nRBC 0 /100 WBCs       Radiology Results: I have personally reviewed pertinent reports  Other Diagnostic Testing:   I have personally reviewed pertinent reports          Active Meds:   Current Facility-Administered Medications   Medication Dose Route Frequency    acetaminophen (TYLENOL) tablet 975 mg  975 mg Oral Q6H PRN    baclofen tablet 10 mg  10 mg Oral TID    cefepime (MAXIPIME) 2,000 mg in dextrose 5 % 50 mL IVPB  2,000 mg Intravenous Q12H    enoxaparin (LOVENOX) subcutaneous injection 40 mg  40 mg Subcutaneous Daily    HYDROmorphone (DILAUDID) 1 mg/mL injection 0 5 mg  0 5 mg Intravenous Q4H PRN    levothyroxine tablet 100 mcg  100 mcg Oral Early Morning    metroNIDAZOLE (FLAGYL) IVPB (premix) 500 mg  500 mg Intravenous Q8H    nicotine (NICODERM CQ) 21 mg/24 hr TD 24 hr patch 1 patch  1 patch Transdermal Daily    ondansetron (ZOFRAN) injection 4 mg  4 mg Intravenous Q4H PRN    oxyCODONE (ROXICODONE) immediate release tablet 10 mg  10 mg Oral Q4H PRN    sodium chloride 0 9 % infusion  100 mL/hr Intravenous Continuous         VTE Pharmacologic Prophylaxis: Enoxaparin (Lovenox)  VTE Mechanical Prophylaxis: sequential compression device    Trveor London DO

## 2017-12-02 NOTE — PLAN OF CARE
CARDIOVASCULAR - ADULT     Absence of cardiac dysrhythmias or at baseline rhythm Progressing        DISCHARGE PLANNING     Discharge to home or other facility with appropriate resources Progressing        GASTROINTESTINAL - ADULT     Minimal or absence of nausea and/or vomiting Progressing     Maintains or returns to baseline bowel function Progressing     Maintains adequate nutritional intake Progressing        INFECTION - ADULT     Absence or prevention of progression during hospitalization Progressing        Knowledge Deficit     Patient/family/caregiver demonstrates understanding of disease process, treatment plan, medications, and discharge instructions Progressing        METABOLIC, FLUID AND ELECTROLYTES - ADULT     Electrolytes maintained within normal limits Progressing     Fluid balance maintained Progressing        Nutrition/Hydration-ADULT     Nutrient/Hydration intake appropriate for improving, restoring or maintaining nutritional needs Progressing        PAIN - ADULT     Verbalizes/displays adequate comfort level or baseline comfort level Progressing        Potential for Falls     Patient will remain free of falls Progressing        SAFETY ADULT     Maintain or return to baseline ADL function Progressing     Patient will remain free of falls Progressing        SKIN/TISSUE INTEGRITY - ADULT     Skin integrity remains intact Progressing

## 2017-12-02 NOTE — PROGRESS NOTES
Progress Note - General Surgery  Marta Hoover 65 y o  male MRN: [id number]  Unit/Bed#: [facility] 626-01 Encounter: [id number]    Assessment:  65y o -year-old male with abdominal pain and stage 4 colon cancer w/ liver metastases    Plan:  - we feel that this patient has no evidence of gallbladder disease  - at this time, we feel that his pain and distress is directly attributable to his advanced colon cancer with liver metastases  - would offer him regular diet  - surgery will sign off but is available if needed     Karis Jameson MD PGY-4  2:18 PM  12/02/17      Subjective:  Lots of pain in RUQ  Passing gas  Having diarrhea  No nausea or vomiting  Objective:  Patient Vitals for the past 24 hrs:   BP Temp Temp src Pulse Resp SpO2 Height Weight   12/02/17 0831 130/68 (!) 100 8 °F (38 2 °C) Oral (!) 113 18 97 % - -   12/01/17 2342 123/70 99 °F (37 2 °C) - (!) 106 18 97 % - -   12/01/17 2339 123/70 99 °F (37 2 °C) - (!) 106 18 97 % - -   12/01/17 1730 138/83 99 2 °F (37 3 °C) Oral (!) 106 18 99 % 5' 1" (1 549 m) 61 3 kg (135 lb 2 3 oz)   12/01/17 1555 134/73 - - 101 18 99 % - -          Diet Orders            Start     Ordered    12/02/17 1046  Diet Regular; Regular House  Diet effective now     Question Answer Comment   Diet Type Regular    Regular Regular House    RD to adjust diet per protocol?  Yes        12/02/17 1045        Intake/Output Summary (Last 24 hours) at 12/02/17 1418  Last data filed at 12/02/17 0534   Gross per 24 hour   Intake          2381 66 ml   Output                0 ml   Net          2381 66 ml        Physical Exam:  General: very uncomfortable  Cardiovascular: RRR  Respiratory: breath sounds b/l  Abdomen: soft, tender RUQ, mild distension  Extremities: no edema    Medications:    baclofen 10 mg Oral TID   cefepime 2,000 mg Intravenous Q12H   enoxaparin 40 mg Subcutaneous Daily   furosemide 20 mg Intravenous Once   levothyroxine 100 mcg Oral Early Morning   metroNIDAZOLE 500 mg Intravenous Q8H   nicotine 1 patch Transdermal Daily     sodium chloride 100 mL/hr Last Rate: 100 mL/hr (12/02/17 0534)     acetaminophen 975 mg Q6H PRN   hydrocortisone  4x Daily PRN   HYDROmorphone 0 5 mg Q4H PRN   ondansetron 4 mg Q4H PRN   oxyCODONE 10 mg Q4H PRN     Laboratory results:   CBC:   Lab Results   Component Value Date    WBC 18 51 (H) 12/02/2017    HGB 6 8 (LL) 12/02/2017    HCT 21 9 (L) 12/02/2017    MCV 76 (L) 12/02/2017     12/02/2017    MCH 23 5 (L) 12/02/2017    MCHC 31 1 (L) 12/02/2017    RDW 19 8 (H) 12/02/2017    MPV 8 8 (L) 12/02/2017    NRBC 0 12/02/2017   , CMP:   Lab Results   Component Value Date     12/02/2017    K 3 3 (L) 12/02/2017     12/02/2017    CO2 20 (L) 12/02/2017    ANIONGAP 10 12/02/2017    BUN 8 12/02/2017    CREATININE 0 59 (L) 12/02/2017    GLUCOSE 74 12/02/2017    CALCIUM 8 0 (L) 12/02/2017    AST 85 (H) 12/02/2017    ALT 12 12/02/2017    ALKPHOS 268 (H) 12/02/2017    PROT 6 3 (L) 12/02/2017    ALBUMIN 2 0 (L) 12/02/2017    BILITOT 0 72 12/02/2017    EGFR 113 12/02/2017   , Coagulation: No results found for: PT, INR, APTT, Urinalysis: No results found for: COLORU, CLARITYU, SPECGRAV, PHUR, LEUKOCYTESUR, NITRITE, PROTEINUA, GLUCOSEU, KETONESU, BILIRUBINUR, BLOODU, Amylase: No results found for: AMYLASE, Lipase: No results found for: LIPASE    VTE Pharmacologic Prophylaxis: Enoxaparin (Lovenox)  VTE Mechanical Prophylaxis: sequential compression device

## 2017-12-02 NOTE — INCIDENTAL FINDINGS
CXR showed Indeterminate 13 mm lucency in the right humeral head     the patient was instructed that he had this lesion and that it will require follow-up as an outpatient

## 2017-12-03 LAB
ABO GROUP BLD BPU: NORMAL
ABO GROUP BLD BPU: NORMAL
ANION GAP SERPL CALCULATED.3IONS-SCNC: 8 MMOL/L (ref 4–13)
BASOPHILS # BLD AUTO: 0.03 THOUSANDS/ΜL (ref 0–0.1)
BASOPHILS NFR BLD AUTO: 0 % (ref 0–1)
BPU ID: NORMAL
BPU ID: NORMAL
BUN SERPL-MCNC: 10 MG/DL (ref 5–25)
CALCIUM SERPL-MCNC: 8.6 MG/DL (ref 8.3–10.1)
CHLORIDE SERPL-SCNC: 108 MMOL/L (ref 100–108)
CO2 SERPL-SCNC: 23 MMOL/L (ref 21–32)
CREAT SERPL-MCNC: 0.72 MG/DL (ref 0.6–1.3)
CROSSMATCH: NORMAL
CROSSMATCH: NORMAL
EOSINOPHIL # BLD AUTO: 0.26 THOUSAND/ΜL (ref 0–0.61)
EOSINOPHIL NFR BLD AUTO: 1 % (ref 0–6)
ERYTHROCYTE [DISTWIDTH] IN BLOOD BY AUTOMATED COUNT: 17.9 % (ref 11.6–15.1)
GFR SERPL CREATININE-BSD FRML MDRD: 104 ML/MIN/1.73SQ M
GLUCOSE SERPL-MCNC: 143 MG/DL (ref 65–140)
HCT VFR BLD AUTO: 32 % (ref 36.5–49.3)
HGB BLD-MCNC: 10.2 G/DL (ref 12–17)
LYMPHOCYTES # BLD AUTO: 1.15 THOUSANDS/ΜL (ref 0.6–4.47)
LYMPHOCYTES NFR BLD AUTO: 6 % (ref 14–44)
MAGNESIUM SERPL-MCNC: 1.9 MG/DL (ref 1.6–2.6)
MCH RBC QN AUTO: 24.5 PG (ref 26.8–34.3)
MCHC RBC AUTO-ENTMCNC: 31.9 G/DL (ref 31.4–37.4)
MCV RBC AUTO: 77 FL (ref 82–98)
MONOCYTES # BLD AUTO: 1.97 THOUSAND/ΜL (ref 0.17–1.22)
MONOCYTES NFR BLD AUTO: 9 % (ref 4–12)
NEUTROPHILS # BLD AUTO: 17.48 THOUSANDS/ΜL (ref 1.85–7.62)
NEUTS SEG NFR BLD AUTO: 84 % (ref 43–75)
NRBC BLD AUTO-RTO: 0 /100 WBCS
PHOSPHATE SERPL-MCNC: 1.4 MG/DL (ref 2.7–4.5)
PLATELET # BLD AUTO: 229 THOUSANDS/UL (ref 149–390)
PMV BLD AUTO: 9.7 FL (ref 8.9–12.7)
POTASSIUM SERPL-SCNC: 3.5 MMOL/L (ref 3.5–5.3)
RBC # BLD AUTO: 4.17 MILLION/UL (ref 3.88–5.62)
SODIUM SERPL-SCNC: 139 MMOL/L (ref 136–145)
UNIT DISPENSE STATUS: NORMAL
UNIT DISPENSE STATUS: NORMAL
UNIT PRODUCT CODE: NORMAL
UNIT PRODUCT CODE: NORMAL
UNIT RH: NORMAL
UNIT RH: NORMAL
WBC # BLD AUTO: 21 THOUSAND/UL (ref 4.31–10.16)

## 2017-12-03 PROCEDURE — 30243N1 TRANSFUSION OF NONAUTOLOGOUS RED BLOOD CELLS INTO CENTRAL VEIN, PERCUTANEOUS APPROACH: ICD-10-PCS | Performed by: INTERNAL MEDICINE

## 2017-12-03 PROCEDURE — 85025 COMPLETE CBC W/AUTO DIFF WBC: CPT | Performed by: INTERNAL MEDICINE

## 2017-12-03 PROCEDURE — 80048 BASIC METABOLIC PNL TOTAL CA: CPT | Performed by: INTERNAL MEDICINE

## 2017-12-03 PROCEDURE — 83735 ASSAY OF MAGNESIUM: CPT | Performed by: INTERNAL MEDICINE

## 2017-12-03 PROCEDURE — P9021 RED BLOOD CELLS UNIT: HCPCS

## 2017-12-03 PROCEDURE — 84100 ASSAY OF PHOSPHORUS: CPT | Performed by: INTERNAL MEDICINE

## 2017-12-03 RX ORDER — MAGNESIUM HYDROXIDE/ALUMINUM HYDROXICE/SIMETHICONE 120; 1200; 1200 MG/30ML; MG/30ML; MG/30ML
15 SUSPENSION ORAL EVERY 4 HOURS PRN
Status: DISCONTINUED | OUTPATIENT
Start: 2017-12-03 | End: 2017-12-04 | Stop reason: HOSPADM

## 2017-12-03 RX ADMIN — ALUMINUM HYDROXIDE, MAGNESIUM HYDROXIDE, AND SIMETHICONE 15 ML: 200; 200; 20 SUSPENSION ORAL at 12:19

## 2017-12-03 RX ADMIN — OXYCODONE HYDROCHLORIDE 10 MG: 10 TABLET ORAL at 22:44

## 2017-12-03 RX ADMIN — METRONIDAZOLE 500 MG: 500 INJECTION, SOLUTION INTRAVENOUS at 12:28

## 2017-12-03 RX ADMIN — CEFEPIME 2000 MG: 2 INJECTION, POWDER, FOR SOLUTION INTRAMUSCULAR; INTRAVENOUS at 23:50

## 2017-12-03 RX ADMIN — ACETAMINOPHEN 975 MG: 325 TABLET, FILM COATED ORAL at 15:50

## 2017-12-03 RX ADMIN — OXYCODONE HYDROCHLORIDE 10 MG: 10 TABLET ORAL at 12:19

## 2017-12-03 RX ADMIN — BACLOFEN 10 MG: 10 TABLET ORAL at 15:01

## 2017-12-03 RX ADMIN — ALUMINUM HYDROXIDE, MAGNESIUM HYDROXIDE, AND SIMETHICONE 15 ML: 200; 200; 20 SUSPENSION ORAL at 15:00

## 2017-12-03 RX ADMIN — LEVOTHYROXINE SODIUM 100 MCG: 100 TABLET ORAL at 05:20

## 2017-12-03 RX ADMIN — ENOXAPARIN SODIUM 40 MG: 40 INJECTION SUBCUTANEOUS at 08:12

## 2017-12-03 RX ADMIN — NICOTINE 1 PATCH: 21 PATCH, EXTENDED RELEASE TRANSDERMAL at 08:12

## 2017-12-03 RX ADMIN — CEFEPIME 2000 MG: 2 INJECTION, POWDER, FOR SOLUTION INTRAMUSCULAR; INTRAVENOUS at 11:04

## 2017-12-03 RX ADMIN — HYDROMORPHONE HYDROCHLORIDE 0.5 MG: 1 INJECTION, SOLUTION INTRAMUSCULAR; INTRAVENOUS; SUBCUTANEOUS at 21:15

## 2017-12-03 RX ADMIN — BACLOFEN 10 MG: 10 TABLET ORAL at 08:12

## 2017-12-03 RX ADMIN — METRONIDAZOLE 500 MG: 500 INJECTION, SOLUTION INTRAVENOUS at 20:52

## 2017-12-03 RX ADMIN — METRONIDAZOLE 500 MG: 500 INJECTION, SOLUTION INTRAVENOUS at 08:12

## 2017-12-03 RX ADMIN — OXYCODONE HYDROCHLORIDE 10 MG: 10 TABLET ORAL at 03:28

## 2017-12-03 RX ADMIN — OXYCODONE HYDROCHLORIDE 10 MG: 10 TABLET ORAL at 08:12

## 2017-12-03 RX ADMIN — BACLOFEN 10 MG: 10 TABLET ORAL at 20:55

## 2017-12-03 RX ADMIN — ACETAMINOPHEN 975 MG: 325 TABLET, FILM COATED ORAL at 03:28

## 2017-12-03 RX ADMIN — HYDROMORPHONE HYDROCHLORIDE 0.5 MG: 1 INJECTION, SOLUTION INTRAMUSCULAR; INTRAVENOUS; SUBCUTANEOUS at 14:59

## 2017-12-03 RX ADMIN — OXYCODONE HYDROCHLORIDE 10 MG: 10 TABLET ORAL at 18:18

## 2017-12-03 RX ADMIN — ONDANSETRON 4 MG: 2 INJECTION INTRAMUSCULAR; INTRAVENOUS at 14:59

## 2017-12-03 NOTE — CASE MANAGEMENT
[address] [facility] in the [facility] by Kenrick Mayo for 2017  Network Utilization Review Department  Phone: [phone]; Fax [phone]  ATTENTION: The Network Utilization Review Department is now centralized for our 7 Facilities  Make a note that we have a new phone and fax numbers for our Department  Please call with any questions or concerns to [phone] and carefully follow the prompts so that you are directed to the right person  All voicemails are confidential  Fax any determinations, approvals, denials, and requests for initial or continue stay review clinical to [phone]  Due to HIGH CALL volume, it would be easier if you could please send faxed requests to expedite your requests and in part, help us provide discharge notifications faster  Initial Clinical Review    Admission: Date/Time/Statement: 12/1/17 @ 1351     Orders Placed This Encounter   Procedures    Inpatient Admission (expected length of stay for this patient is greater than two midnights)     Standing Status:   Standing     Number of Occurrences:   1     Order Specific Question:   Admitting Physician     Answer:   So Gonzalez     Order Specific Question:   Level of Care     Answer:   Med Surg [16]     Order Specific Question:   Estimated length of stay     Answer:   More than 2 Midnights     Order Specific Question:   Certification     Answer:   I certify that inpatient services are medically necessary for this patient for a duration of greater than two midnights  See H&P and MD Progress Notes for additional information about the patient's course of treatment       ED: Date/Time/Mode of Arrival:   ED Arrival Information     Expected Arrival Acuity Means of Arrival Escorted By Service Admission Type    - 12/1/2017 10:13 Emergent Walk-In Family Member General Medicine Emergency    Arrival Complaint    Abdominal Pain        Chief Complaint:   Chief Complaint   Patient presents with  Abdominal Pain     history of colon cancer, recently arrived from [address] [facility] , hasnt had any chemo for 4 weeks, + nausea and vomiting     History of Illness: Yanira Hall is a 65 y o  male the past medical history of hypothyroidism, stage IV colon cancer with metastasis to the liver (diagnosed in 2014) status post hemicolectomy with an ostomy bag with reanastomosis, currently not on chemotherapy presented to the emergency room for evaluation of abdominal pain  Patient is currently visiting from [facility] for the last 3 weeks  Approximately 3 weeks ago patient was hospitalized for acute diverticulitis which required intravenous fluids and antibiotics  Patient was discharged with Augmentin in a stable state  However he says abdominal pain is chronic in nature and waxes and wanes  Throughout the last 2 weeks patient says that he has had progressive malaise, decreased appetite and subjective fevers and chills worsening these last couple days  This morning patient had severe abdominal pain, nausea and 1 episode of nonbloody vomiting and presented to the emergency room for evaluation  Patient says the abdominal pain is located mainly in the right upper quadrant, described as sharp and worsened after meals  Pain describes the pain as sharp and radiates to his right shoulder  Patient takes Percocet from his previous hospitalization, which alleviates the pain  Patient admits to subjective weight loss however has not measured his weight  Patient is a current an everyday smoker since the age of 21yr, approximately 15 cigarettes per day  Patient currently does not drink alcohol  Patient follows up with hematologist and oncologist Sarah Day  Patient last dose of chemotherapy was 3 weeks ago       ED Vital Signs:   ED Triage Vitals   Temperature Pulse Respirations Blood Pressure SpO2   12/01/17 1026 12/01/17 1026 12/01/17 1026 12/01/17 1026 12/01/17 1026   99 8 °F (37 7 °C) (!) 124 20 114/62 99 %      Temp Source Heart Rate Source Patient Position - Orthostatic VS BP Location FiO2 (%)   12/01/17 1026 12/01/17 1146 12/01/17 1146 12/03/17 0003 --   Oral Monitor Lying Right arm       Pain Score       12/01/17 1026       8        Wt Readings from Last 1 Encounters:   12/01/17 61 3 kg (135 lb 2 3 oz)     Vital Signs (abnormal):   12/03/17 1158  98 1 °F (36 7 °C)   116  18  132/72  100 %  None (Room air)  --   12/02/17 2348  99 °F (37 2 °C)   109  18  117/65  --  --  --   12/02/17 2239  100 3 °F (37 9 °C)   126  20  121/68  96 %  --  --   12/02/17 1900   103 1 °F (39 5 °C)   127  20  144/68  98 %  --  --   12/02/17 1500  98 6 °F (37 °C)   108  20  118/70  100 %  --  --   12/02/17 0831   100 8 °F (38 2 °C)   113  18  130/68  97 %  None (Room air)  --   12/01/17 2342  99 °F (37 2 °C)   106  18  123/70  97 %  --  --   12/01/17 2339  99 °F (37 2 °C)   106  18  123/70  97 %  --  --   12/01/17 1730  99 2 °F (37 3 °C)   106  18  138/83  99 %  None (Room air)  Lying   12/01/17 1308   101 1 °F (38 4 °C)  --  --  --  --  --  --   12/01/17 1300  --   111  20  124/68  97 %  None (Room air)  Lying   12/01/17 1146  --   114  20  157/70  99 %  --  Lying   12/01/17 1100   102 8 °F (39 3 °C)   118  21  151/70  --  --  --   12/01/17 1026  99 8 °F (37 7 °C)   124  20  114/62  99 %  --  --     Abnormal Labs:   12/1 12/2 12/3   Sodium 133     138    139      Potassium 3 7  3 3 3 5   CO2 25  20 23   Creatinine 0 70  0 59 0 72   Glucose 92  74 143   Calcium 9 0  8 0 8 6   AST 81  85    Alkaline Phosphatase 390  268    Total Protein 7 9  6 3    Albumin 2 7  2 0    Phosphorus   2 0 1 4   CHLORIDE, ISTAT  98     GGT 1,201          WBC 19 50    18 51    21 00   RBC 3 74 2 89 4 17   Hemoglobin 8 7 6 8 10 2   Hematocrit 28 4 21 9 32 0   MCV 76 76 77   MCH 23 3 23 5 24 5   MCHC 30 6 31 1 31 9   RDW 19 8 19 8 17 9   MPV 9 5 8 8 9 7     PT/INR 16 2/1 29  PTT 47  GGT 1201  Protein, UA Negative mg/dl 30 (1+)     Urobilinogen, UA 0 2, 1 0 E U /dl E U /dl 2 0     Bilirubin, UA Negative  Interference- unable to analyze     Blood cultures pending    Diagnostic Test Results:     CT abd, pelvis - 1  Abnormal appearance of the gallbladder, suspicious for acute cholecystitis  Consider follow-up nuclear medicine HIDA scan to rule out the presence of cystic duct obstruction  2   Diffuse abnormal appearance of the liver, most compatible with metastatic disease  3   Abnormal appearing loops of small bowel in the right upper quadrant, likely reactive ileus  CXR - Indeterminate 13 mm lucency in the right humeral head  Consider dedicated workup      US RUQ - 1  Numerous lesions throughout the liver in keeping with known metastatic disease  Mild hepatomegaly also present  2   No sonographic evidence of cholelithiasis or acute cholecystitis    3   Trace ascites adjacent to the gallbladder fossa      ED Treatment:   Medication Administration from 12/01/2017 1013 to 12/01/2017 1616    Date/Time Order Dose Route Action   12/01/2017 1120 sodium chloride 0 9 % bolus 1,000 mL 1,000 mL Intravenous New Bag   12/01/2017 1120 HYDROmorphone (DILAUDID) 1 mg/mL injection 0 5 mg 0 5 mg Intravenous Given   12/01/2017 1121 ondansetron (ZOFRAN) injection 4 mg 4 mg Intravenous Given   12/01/2017 1114 acetaminophen (TYLENOL) rectal suppository 650 mg 650 mg Rectal Not Given   12/01/2017 1127 acetaminophen (TYLENOL) tablet 650 mg 650 mg Oral Given   12/01/2017 1318 metroNIDAZOLE (FLAGYL) IVPB (premix) 500 mg 500 mg Intravenous New Bag   12/01/2017 1430 vancomycin (VANCOCIN) IVPB (premix) 1,000 mg 1,000 mg Intravenous New Bag   12/01/2017 1301 cefepime (MAXIPIME) IVPB (premix) 2,000 mg 2,000 mg Intravenous New Bag   12/01/2017 1157 sodium chloride 0 9 % bolus 1,000 mL 1,000 mL Intravenous New Bag   12/01/2017 1237 iohexol (OMNIPAQUE) 350 MG/ML injection (MULTI-DOSE) 100 mL 100 mL Intravenous Given   12/01/2017 1553 HYDROmorphone (DILAUDID) 1 mg/mL injection 0 5 mg 0 5 mg Intravenous Given Past Medical/Surgical History: Active Ambulatory Problems     Diagnosis Date Noted    No Active Ambulatory Problems     Resolved Ambulatory Problems     Diagnosis Date Noted    No Resolved Ambulatory Problems     Past Medical History:   Diagnosis Date    [facility])     [facility]     Disease of thyroid gland      Admitting Diagnosis: Cholecystitis [K81 9]  Abdominal pain [R10 9]  Sepsis ([facility] ) [A41 9]    Age/Sex: 65 y o  male    Assessment/Plan:   Sepsis  On admission, patient temp 102 8°,  heart rate 118 and leukocytosis -met sepsis criteria  Source likely from acute cholecystitis  Chest x-ray showed no intrathoracic pathology, UA   Lactic acid 1 9  Blood cultures x 2 pending  Intravenous fluids initiated  Current hemodynamically stable  Received a 1 time dose of vancomycin cefepime in the emergency room  Currently on intravenous cefepime and Flagyl      Acute Cholecystitis  CT of the abdomen and pelvis showed a normal appearance of gallbladder  Right upper quadrant ultrasound showed no sonographic evidence of cholecystitis or acute cholecystitis  There is trace ascites adjacent to the gallbladder and numerous  Surgery consult placed  Maintain NPO  Likely not a surgical candidate secondary to his metastatic disease of the liver  Intravenous Dilaudid and Oxycodone for pain as needed  Continue IV Cefepime and Flagyl  Symptomatic treatment with intravenous Zofran  CMP in the am     Stage IV Colon Cancer with Liver Metastasis  Diagnosed in September 2014 with stage 4  Status post hemicolectomy and ostomy bag with a anastomosis  Status post chemotherapy for 3 weeks with Erbitox, FOLFIRI,   Currently not on any chemotherapy  Follows up with Hematologist/Oncologist Parke Mohs in [facility]  CT abdomen and pelvis-Diffuse abnormal appearance of the liver compatible with metastatic disease    Currently AST 81, ALT 18, PTT 16 2, INR 1 29     Hypothyroidism  Continue Synthroid 100 mcg     Elevated Alkaline Phosphatase    CMP in the morning  GGT pending  Code Status: Level 3 - DNAR/DNI  VTE Pharmacologic Prophylaxis: Sequential compression device (Venodyne)  and Enoxaparin (Lovenox)   VTE Mechanical Prophylaxis: sequential compression device  Admission Status: INPATIENT      Admission Orders:  Scheduled Meds:   baclofen 10 mg Oral TID   cefepime 2,000 mg Intravenous Q12H   enoxaparin 40 mg Subcutaneous Daily   levothyroxine 100 mcg Oral Early Morning   metroNIDAZOLE 500 mg Intravenous Q8H   nicotine 1 patch Transdermal Daily     Continuous Infusions:   sodium chloride 100 mL/hr Last Rate: Stopped (12/03/17 0201)     PRN Meds:   Acetaminophen x3    aluminum-magnesium hydroxide-simethicone x2    hydrocortisone    HYDROmorphone x 6 since admission    influenza vaccine    Ondansetron x1    oxyCODONE 8 since admission    Tele  SCDs  Up with assist  VS q 3 hrs with transfusion  Diet Gi, lo fiber, lo residue  Cons GI  Cons Surgery   _______________________________  12/1 Surgery Consult  52-year-old male with:   1  Abdominal pain and fever likely secondary to acute cholecystitis  - Keep NPO except for sips with meds for now  - IV fluid resuscitation               - Broad-spectrum IV antibiotics  - Follow-up blood cultures  - Consider percutaneous cholecystostomy drainage if failure to respond to conservative management with antibiotics  - Based on patient's history of stage IV colon cancer with significant hepatic metastatic disease burden, the patient is not a candidate for cholecystectomy at this time               - P r n  Analgesia      2  Sepsis likely secondary to #1               - Broad-spectrum IV antibiotics  - Follow-up blood cultures                - Management of cholecystitis as above      3   Stage IV colon cancer               - Management per medical service  __________________________________  12/2 Medicine Progress Note  Principal Problem:    Sepsis ([facility] )  Active Problems:    Cholecystitis    Colon cancer metastasized to liver ([facility] )    Anemia    Leukocytosis     Sepsis secondary to acute cholecystitis:              -we will continue with Flagyl and cefepime at this point  Today is antibiotic day 2  He continues to spike fever this morning               -blood cultures are pending               -right upper quadrant ultrasound, chest x-ray, and CT abdomen and pelvis all reviewed  -WBC count 18 51               -I will continue his intravenous fluids  -follow-up blood culture      Anemia: Hgb 6 8 this morning  Decreased from 9 7 on admission                -baseline is unknown [place]               -will transfuse with 2 unit PRBC, consent obtained              -etiology is likely multifactorial due to anemia of chronic disease given his history of colon cancer, but will also evaluate for FOBT     Acute cholecystitis:              -surgical input appreciated  No surgical intervention planned at this time               -continue with pain control as ordered      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:              -Diagnosed in September 2014 with stage 4   Status post hemicolectomy and ostomy bag with a anastomosis  Status post chemotherapy for 3 weeks with Erbitox, FOLFIRI,   Currently not on any chemotherapy   Follows up with Hematologist/Oncologist Janey Garg in [facility]     Hypothyroidism:  Continue with Synthroid     Elevated alkaline phosphatase:   This is likely secondary to his colon cancer with liver metastasis, versus may be related to possible osteolytic lesion in the right humeral head, see below, however the etiology of this lesion is indeterminate and will require further follow-up      R Humeral head lesion:  Patient has a 13 mm lucency in the right humeral head  He will need follow-up for this                -he will need follow-up after acute stage of his illness here   _______________________________________   12/2 Surgery Progress Note  Assessment:  65y o -year-old male with abdominal pain and stage 4 colon cancer w/ liver metastases     Plan:  - we feel that this patient has no evidence of gallbladder disease  - at this time, we feel that his pain and distress is directly attributable to his advanced colon cancer with liver metastases  - would offer him regular diet  - surgery will sign off but is available if needed

## 2017-12-03 NOTE — PROGRESS NOTES
1 unit PRBC completed infusing  No adverse reactions noted  Patient checked at 15 minutes, 30 minutes and Q 60 minutes until transfusion completed  Will continue to monitor

## 2017-12-03 NOTE — PROGRESS NOTES
IM Residency Progress Note   Unit/Bed#: [facility] 626-01 Encounter: [id number]  SOD Team B       Marga Moncada 65 y o  male [id number]    Hospital Stay Days: 2      Assessment/Plan:    Principal Problem:    Sepsis ([facility] )  Active Problems:    Cholecystitis    Colon cancer metastasized to liver ([facility] )    Anemia    Leukocytosis    Sepsis secondary to acute cholecystitis  Afebrile, hemodynamically stable, nontoxic appearing  CBC this morning is pending  Blood cultures have been negative for 24 hours  Continue intravenous Flagyl and cefepime, day #3      Anemia  Likely multifactorial, anemia chronic disease given history of metastatic colon cancer  Occult blood is negative  Status post 2 units packed RBC  CBC this morning is pending      Acute cholecystitis  Right upper quadrant ultrasound did not show any evidence of acute cholecystitis and surgery felt that this was most likely reactive pain and inflammation secondary to his metastatic colon cancer  No surgical interventions are planned at this time  Continue with intravenous pain management  Will likely transition to p o  once better oral intake  Stage Intravenous colon cancer with liver metastasis:   Diagnosed in September 2014 with stage 4   Status post hemicolectomy and ostomy bag with a anastomosis  Status post chemotherapy for 3 weeks with Erbitox, FOLFIRI,   Currently not on any chemotherapy   Follows up with Hematologist/Oncologist Santy Francis in [facility]     Hypothyroidism  Continue with Synthroid     Elevated alkaline phosphatase  This is likely secondary to his colon cancer with liver metastasis, versus may be related to possible osteolytic lesion in the right humeral head, see below, however the etiology of this lesion is indeterminate and will require further follow-up      R Humeral head lesion  Patient has a 13 mm lucency in the right humeral head  He will need follow-up for this                   Disposition:  Continue to monitor       Subjective:   Patient seen and examined  Patient feels relatively well he still complaining of some mild discomfort on the right upper quadrant worsened by eating  Bowel movements has been normal without any blood  No nausea or vomiting  Other review of system was negative at this time  Vitals: Temp (24hrs), Av 9 °F (37 7 °C), Min:98 6 °F (37 °C), Max:103 1 °F (39 5 °C)  Current: Temperature: 99 °F (37 2 °C)  Vitals:    17 2348 17 0003 17 0322 17 0400   BP: 117/65 118/58 108/65 118/70   Pulse: (!) 109 105 100 101   Resp: 18 18 18 18   Temp: 99 °F (37 2 °C) 99 °F (37 2 °C) 99 4 °F (37 4 °C) 99 °F (37 2 °C)   TempSrc:  Oral Oral Oral   SpO2:  96%     Weight:       Height:        Body mass index is 25 53 kg/m²  I/O last 24 hours: In: 3551 9 [P O :690; I V :2105; Blood:356 7; IV Piggyback:400 2]  Out: 800 [Urine:800]      Physical Exam: /70   Pulse 101   Temp 99 °F (37 2 °C) (Oral)   Resp 18   Ht 5' 1" (1 549 m)   Wt 61 3 kg (135 lb 2 3 oz)   SpO2 96%   BMI 25 53 kg/m²   General appearance: alert, appears stated age and cooperative  Head: Normocephalic, without obvious abnormality, atraumatic  Lungs: clear to auscultation bilaterally  Heart: regular rate and rhythm, S1, S2 normal, no murmur, click, rub or gallop  Abdomen: Mild epigastric and right upper quadrant tenderness  Hyperactive bowel sounds  Mildly distended    Extremities: extremities normal, atraumatic, no cyanosis or edema  Pulses: 2+ and symmetric     Invasive Devices     Peripheral Intravenous Line            Peripheral IV 17 Left Antecubital 1 day                          Labs:   Recent Results (from the past 24 hour(s))   CBC and differential    Collection Time: 17  8:39 AM   Result Value Ref Range    WBC 18 51 (H) 4 31 - 10 16 Thousand/uL    RBC 2 89 (L) 3 88 - 5 62 Million/uL    Hemoglobin 6 8 (LL) 12 0 - 17 0 g/dL    Hematocrit 21 9 (L) 36 5 - 49 3 %    MCV 76 (L) 82 - 98 fL    MCH 23 5 (L) 26 8 - 34 3 pg    MCHC 31 1 (L) 31 4 - 37 4 g/dL    RDW 19 8 (H) 11 6 - 15 1 %    MPV 8 8 (L) 8 9 - 12 7 fL    Platelets 250 062 - 788 Thousands/uL    nRBC 0 /100 WBCs   Magnesium    Collection Time: 12/02/17  8:39 AM   Result Value Ref Range    Magnesium 1 8 1 6 - 2 6 mg/dL   Manual Differential(PHLEBS Do Not Order)    Collection Time: 12/02/17  8:39 AM   Result Value Ref Range    Segmented % 91 (H) 43 - 75 %    Lymphocytes % 1 (L) 14 - 44 %    Monocytes % 7 4 - 12 %    Eosinophils % 0 0 - 6 %    Basophils % 1 0 - 1 %    Absolute Neutrophils 16 84 (H) 1 85 - 7 62 Thousand/uL    Lymphocytes Absolute 0 19 (L) 0 60 - 4 47 Thousand/uL    Monocytes Absolute 1 30 (H) 0 00 - 1 22 Thousand/uL    Eosinophils Absolute 0 00 0 00 - 0 40 Thousand/uL    Basophils Absolute 0 19 (H) 0 00 - 0 10 Thousand/uL    Total Counted      RBC Morphology Present     Anisocytosis Present     Hypochromia Present     Microcytes Present     Ovalocytes Present     Poikilocytes Present     Platelet Estimate Adequate Adequate   Comprehensive metabolic panel    Collection Time: 12/02/17  9:16 AM   Result Value Ref Range    Sodium 138 136 - 145 mmol/L    Potassium 3 3 (L) 3 5 - 5 3 mmol/L    Chloride 108 100 - 108 mmol/L    CO2 20 (L) 21 - 32 mmol/L    Anion Gap 10 4 - 13 mmol/L    BUN 8 5 - 25 mg/dL    Creatinine 0 59 (L) 0 60 - 1 30 mg/dL    Glucose 74 65 - 140 mg/dL    Calcium 8 0 (L) 8 3 - 10 1 mg/dL    AST 85 (H) 5 - 45 U/L    ALT 12 12 - 78 U/L    Alkaline Phosphatase 268 (H) 46 - 116 U/L    Total Protein 6 3 (L) 6 4 - 8 2 g/dL    Albumin 2 0 (L) 3 5 - 5 0 g/dL    Total Bilirubin 0 72 0 20 - 1 00 mg/dL    eGFR 113 ml/min/1 73sq m   Phosphorus    Collection Time: 12/02/17  9:16 AM   Result Value Ref Range    Phosphorus 2 0 (L) 2 7 - 4 5 mg/dL   Occult blood 1-3, stool    Collection Time: 12/02/17  3:52 PM   Result Value Ref Range    Fecal Occult Blood Diagnostic Negative Negative    Fecal Occult Blood Diagnostic 2  Negative Fecal Occult Blood Diagnostic 3  Negative   Type and screen    Collection Time: 12/02/17  7:12 PM   Result Value Ref Range    ABO Grouping AB     Rh Factor Negative     Antibody Screen Negative     Specimen Expiration Date [id number]    Prepare RBC:Has consent been obtained? Yes, 2 Units    Collection Time: 12/03/17  5:55 AM   Result Value Ref Range    Unit Product Code L6213Y67     Unit Number [id number]     Unit ABO AB     Unit [facility] NEG     Crossmatch Compatible     Unit Dispense Status Presumed Trans     Unit Product Code S1637A17     Unit Number [id number]     Unit ABO AB     Unit RH NEG     Crossmatch Compatible     Unit Dispense Status Presumed Trans        Radiology Results: I have personally reviewed pertinent reports  Other Diagnostic Testing:   I have personally reviewed pertinent reports          Active Meds:   Current Facility-Administered Medications   Medication Dose Route Frequency    acetaminophen (TYLENOL) tablet 975 mg  975 mg Oral Q6H PRN    baclofen tablet 10 mg  10 mg Oral TID    cefepime (MAXIPIME) 2,000 mg in dextrose 5 % 50 mL IVPB  2,000 mg Intravenous Q12H    enoxaparin (LOVENOX) subcutaneous injection 40 mg  40 mg Subcutaneous Daily    hydrocortisone 1 % cream   Topical 4x Daily PRN    HYDROmorphone (DILAUDID) 1 mg/mL injection 0 5 mg  0 5 mg Intravenous Q4H PRN    influenza inactivated quadrivalent vaccine (FLULAVAL) IM injection 0 5 mL  0 5 mL Intramuscular Prior to discharge    levothyroxine tablet 100 mcg  100 mcg Oral Early Morning    metroNIDAZOLE (FLAGYL) IVPB (premix) 500 mg  500 mg Intravenous Q8H    nicotine (NICODERM CQ) 21 mg/24 hr TD 24 hr patch 1 patch  1 patch Transdermal Daily    ondansetron (ZOFRAN) injection 4 mg  4 mg Intravenous Q4H PRN    oxyCODONE (ROXICODONE) immediate release tablet 10 mg  10 mg Oral Q4H PRN    sodium chloride 0 9 % infusion  100 mL/hr Intravenous Continuous         VTE Pharmacologic Prophylaxis: Enoxaparin (Lovenox)  VTE Mechanical Prophylaxis: sequential compression device    KARLIE Valle    Internal Medicine PGY-1  12/3/2017 8:12 AM  Pager [phone]

## 2017-12-04 VITALS
OXYGEN SATURATION: 100 % | HEIGHT: 61 IN | SYSTOLIC BLOOD PRESSURE: 123 MMHG | BODY MASS INDEX: 25.52 KG/M2 | HEART RATE: 113 BPM | TEMPERATURE: 102.3 F | DIASTOLIC BLOOD PRESSURE: 75 MMHG | RESPIRATION RATE: 20 BRPM | WEIGHT: 135.14 LBS

## 2017-12-04 PROBLEM — K81.9 CHOLECYSTITIS: Status: RESOLVED | Noted: 2017-12-01 | Resolved: 2017-12-04

## 2017-12-04 LAB
HCT VFR BLD AUTO: 30.1 % (ref 36.5–49.3)
HGB BLD-MCNC: 9.5 G/DL (ref 12–17)

## 2017-12-04 PROCEDURE — 85014 HEMATOCRIT: CPT | Performed by: INTERNAL MEDICINE

## 2017-12-04 PROCEDURE — 85018 HEMOGLOBIN: CPT | Performed by: INTERNAL MEDICINE

## 2017-12-04 RX ORDER — BACLOFEN 20 MG/1
10 TABLET ORAL 3 TIMES DAILY
Qty: 45 TABLET | Refills: 2 | Status: CANCELLED | OUTPATIENT
Start: 2017-12-04 | End: 2018-03-04

## 2017-12-04 RX ORDER — ACETAMINOPHEN 325 MG/1
650 TABLET ORAL EVERY 6 HOURS PRN
Qty: 120 TABLET | Refills: 0 | Status: SHIPPED | OUTPATIENT
Start: 2017-12-04

## 2017-12-04 RX ORDER — NICOTINE 21 MG/24HR
1 PATCH, TRANSDERMAL 24 HOURS TRANSDERMAL DAILY
Qty: 28 PATCH | Refills: 0 | Status: SHIPPED | OUTPATIENT
Start: 2017-12-05

## 2017-12-04 RX ORDER — BACLOFEN 10 MG/1
10 TABLET ORAL 3 TIMES DAILY
Qty: 90 TABLET | Refills: 0 | Status: CANCELLED | OUTPATIENT
Start: 2017-12-04

## 2017-12-04 RX ORDER — LEVOTHYROXINE SODIUM 0.1 MG/1
100 TABLET ORAL DAILY
Qty: 30 TABLET | Refills: 0 | Status: SHIPPED | OUTPATIENT
Start: 2017-12-04

## 2017-12-04 RX ORDER — LANOLIN ALCOHOL/MO/W.PET/CERES
3 CREAM (GRAM) TOPICAL
Qty: 30 TABLET | Refills: 0 | Status: SHIPPED | OUTPATIENT
Start: 2017-12-04

## 2017-12-04 RX ORDER — BACLOFEN 20 MG/1
10 TABLET ORAL 3 TIMES DAILY
Qty: 45 TABLET | Refills: 0 | Status: SHIPPED | OUTPATIENT
Start: 2017-12-04 | End: 2017-12-04

## 2017-12-04 RX ORDER — LEVOTHYROXINE SODIUM 0.1 MG/1
100 TABLET ORAL
Qty: 30 TABLET | Refills: 0 | Status: CANCELLED | OUTPATIENT
Start: 2017-12-05

## 2017-12-04 RX ORDER — BACLOFEN 20 MG/1
20 TABLET ORAL 3 TIMES DAILY
Qty: 45 TABLET | Refills: 0 | Status: SHIPPED | OUTPATIENT
Start: 2017-12-04

## 2017-12-04 RX ORDER — NICOTINE 21 MG/24HR
1 PATCH, TRANSDERMAL 24 HOURS TRANSDERMAL DAILY
Qty: 28 PATCH | Refills: 0 | Status: CANCELLED | OUTPATIENT
Start: 2017-12-04

## 2017-12-04 RX ORDER — LANOLIN ALCOHOL/MO/W.PET/CERES
3 CREAM (GRAM) TOPICAL
Status: DISCONTINUED | OUTPATIENT
Start: 2017-12-04 | End: 2017-12-04 | Stop reason: HOSPADM

## 2017-12-04 RX ORDER — OMEPRAZOLE 20 MG/1
20 CAPSULE, DELAYED RELEASE ORAL DAILY
Qty: 30 CAPSULE | Refills: 0 | Status: SHIPPED | OUTPATIENT
Start: 2017-12-04

## 2017-12-04 RX ADMIN — OXYCODONE HYDROCHLORIDE 10 MG: 10 TABLET ORAL at 05:22

## 2017-12-04 RX ADMIN — ENOXAPARIN SODIUM 40 MG: 40 INJECTION SUBCUTANEOUS at 10:04

## 2017-12-04 RX ADMIN — BACLOFEN 10 MG: 10 TABLET ORAL at 10:04

## 2017-12-04 RX ADMIN — HYDROMORPHONE HYDROCHLORIDE 0.5 MG: 1 INJECTION, SOLUTION INTRAMUSCULAR; INTRAVENOUS; SUBCUTANEOUS at 07:19

## 2017-12-04 RX ADMIN — LEVOTHYROXINE SODIUM 100 MCG: 100 TABLET ORAL at 05:22

## 2017-12-04 RX ADMIN — METRONIDAZOLE 500 MG: 500 INJECTION, SOLUTION INTRAVENOUS at 04:36

## 2017-12-04 RX ADMIN — ACETAMINOPHEN 975 MG: 325 TABLET, FILM COATED ORAL at 16:34

## 2017-12-04 RX ADMIN — OXYCODONE HYDROCHLORIDE 10 MG: 10 TABLET ORAL at 10:11

## 2017-12-04 RX ADMIN — NICOTINE 1 PATCH: 21 PATCH, EXTENDED RELEASE TRANSDERMAL at 10:04

## 2017-12-04 RX ADMIN — BACLOFEN 10 MG: 10 TABLET ORAL at 16:34

## 2017-12-04 RX ADMIN — HYDROMORPHONE HYDROCHLORIDE 0.5 MG: 1 INJECTION, SOLUTION INTRAMUSCULAR; INTRAVENOUS; SUBCUTANEOUS at 15:16

## 2017-12-04 RX ADMIN — ACETAMINOPHEN 975 MG: 325 TABLET, FILM COATED ORAL at 00:01

## 2017-12-04 NOTE — PLAN OF CARE
CARDIOVASCULAR - ADULT     Absence of cardiac dysrhythmias or at baseline rhythm Completed        DISCHARGE PLANNING     Discharge to home or other facility with appropriate resources Completed        DISCHARGE PLANNING - CARE MANAGEMENT     Discharge to post-acute care or home with appropriate resources Completed        GASTROINTESTINAL - ADULT     Minimal or absence of nausea and/or vomiting Completed     Maintains or returns to baseline bowel function Completed     Maintains adequate nutritional intake Completed        INFECTION - ADULT     Absence or prevention of progression during hospitalization Completed        Knowledge Deficit     Patient/family/caregiver demonstrates understanding of disease process, treatment plan, medications, and discharge instructions Completed        METABOLIC, FLUID AND ELECTROLYTES - ADULT     Electrolytes maintained within normal limits Completed     Fluid balance maintained Completed        Nutrition/Hydration-ADULT     Nutrient/Hydration intake appropriate for improving, restoring or maintaining nutritional needs Completed        PAIN - ADULT     Verbalizes/displays adequate comfort level or baseline comfort level Completed        Potential for Falls     Patient will remain free of falls Completed        SAFETY ADULT     Maintain or return to baseline ADL function Completed     Patient will remain free of falls Completed        SKIN/TISSUE INTEGRITY - ADULT     Skin integrity remains intact Completed

## 2017-12-04 NOTE — DISCHARGE INSTR - AVS FIRST PAGE
Follow up with Dr Grecia Hernandez Hematology/oncologist for chemotherapy  Please bring medical records from her doctor Shira Suresh Back can tell your doctor to fax the medical records to [phone]  Follow up with [facility] [place] as needed to make an appointment with PCP  Call Dr Creig Lefort at [phone] for any questions

## 2017-12-04 NOTE — PROGRESS NOTES
IM Residency Progress Note   Unit/Bed#: [facility] 626-01 Encounter: [id number]  SOD Team B       Lindsey Ascencio 65 y o  male [id number]    Hospital Stay Days: 3      Assessment/Plan:    Principal Problem:    Sepsis ([facility] )  Active Problems:    Cholecystitis    [facility]    Anemia    Leukocytosis    Sepsis   No cholecystitis on right upper quadrant ultrasound  Likely secondary to inflammatory response from colon metastasis to liver  Afebrile, hemodynamically stable, nontoxic appearing  WBC trending up  Leukocyte count today is 22 K  T-max over the last 24 hours was 100 6   Blood cultures have been negative for 48 hours  Continue intravenous Flagyl and cefepime, day #4  Patient has been tachycardic intermittently, blood pressure has been stable      Anemia  Likely multifactorial, anemia chronic disease given history of metastatic colon cancer  Occult blood is negative  Status post 2 units packed RBC  Hemoglobin today is 9 5     Acute cholecystitis  Right upper quadrant ultrasound did not show any evidence of acute cholecystitis and surgery felt that this was most likely reactive pain and inflammation secondary to his metastatic colon cancer  No surgical interventions are planned at this time  Continue with intravenous pain management  Will likely transition to p o  once better oral intake       Stage Intravenous colon cancer with liver metastasis  Diagnosed in September 2014 with stage 4   Status post hemicolectomy and ostomy bag with a anastomosis    Status post chemotherapy for 3 weeks with Erbitox, FOLFIRI,   Currently not on any chemotherapy   Follows up with Hematologist/Oncologist Gloria Milligan in [facility]  Patient would like follow-up with hematologist/oncologist     Hypothyroidism  Continue with Synthroid     Elevated alkaline phosphatase  This is likely secondary to his colon cancer with liver metastasis, versus may be related to possible osteolytic lesion in the right humeral head, see below, however the etiology of this lesion is indeterminate and will require further follow-up      R Humeral head lesion  Patient has a 13 mm lucency in the right humeral head   He will need follow-up for this        Disposition:  Requiring parental pain medication  Subjective:   Patient seen and examined this morning  Patient states that he has been having fevers, however was given Tylenol and resolved  Patient has been complaining of abdominal pain worsening and night however this is not new for him  He states that he has intermittent abdominal pain throughout the day mostly related to meal intake  He denies any nausea, vomiting diarrhea or constipation  He had a regular bowel movement yesterday  Vitals: Temp (24hrs), Av 9 °F (37 2 °C), Min:97 6 °F (36 4 °C), Max:100 6 °F (38 1 °C)  Current: Temperature: 99 1 °F (37 3 °C)  Vitals:    17 2250 17 0000 17 0300 17 0756   BP: 132/75  145/92 118/60   Pulse: (!) 110  (!) 108 102   Resp: 20  20 20   Temp: 98 9 °F (37 2 °C) 99 2 °F (37 3 °C) 98 °F (36 7 °C) 99 1 °F (37 3 °C)   TempSrc: Oral Oral Oral Oral   SpO2: 100%  97% 99%   Weight:       Height:        Body mass index is 25 53 kg/m²  I/O last 24 hours:   In: 36 [P O :820]  Out: 1350 [Urine:1350]      Physical Exam: /60   Pulse 102   Temp 99 1 °F (37 3 °C) (Oral)   Resp 20   Ht 5' 1" (1 549 m)   Wt 61 3 kg (135 lb 2 3 oz)   SpO2 99%   BMI 25 53 kg/m²   General appearance: alert, appears stated age and cooperative  Head: Normocephalic, without obvious abnormality, atraumatic  Lungs: clear to auscultation bilaterally  Heart: regular rate and rhythm, S1, S2 normal, no murmur, click, rub or gallop  Abdomen: soft, non-tender; bowel sounds normal; no masses,  no organomegaly  Extremities: extremities normal, atraumatic, no cyanosis or edema  Pulses: 2+ and symmetric  Skin: Skin color, texture, turgor normal  No rashes or lesions Invasive Devices     Peripheral Intravenous Line            Peripheral IV 12/01/17 Left Antecubital 2 days                          Labs:   Recent Results (from the past 24 hour(s))   CBC and differential    Collection Time: 12/03/17 10:22 AM   Result Value Ref Range    WBC 21 00 (H) 4 31 - 10 16 Thousand/uL    RBC 4 17 3 88 - 5 62 Million/uL    Hemoglobin 10 2 (L) 12 0 - 17 0 g/dL    Hematocrit 32 0 (L) 36 5 - 49 3 %    MCV 77 (L) 82 - 98 fL    MCH 24 5 (L) 26 8 - 34 3 pg    MCHC 31 9 31 4 - 37 4 g/dL    RDW 17 9 (H) 11 6 - 15 1 %    MPV 9 7 8 9 - 12 7 fL    Platelets 874 717 - 608 Thousands/uL    nRBC 0 /100 WBCs    Neutrophils Relative 84 (H) 43 - 75 %    Lymphocytes Relative 6 (L) 14 - 44 %    Monocytes Relative 9 4 - 12 %    Eosinophils Relative 1 0 - 6 %    Basophils Relative 0 0 - 1 %    Neutrophils Absolute 17 48 (H) 1 85 - 7 62 Thousands/µL    Lymphocytes Absolute 1 15 0 60 - 4 47 Thousands/µL    Monocytes Absolute 1 97 (H) 0 17 - 1 22 Thousand/µL    Eosinophils Absolute 0 26 0 00 - 0 61 Thousand/µL    Basophils Absolute 0 03 0 00 - 0 10 Thousands/µL   Phosphorus    Collection Time: 12/03/17 10:22 AM   Result Value Ref Range    Phosphorus 1 4 (L) 2 7 - 4 5 mg/dL   Magnesium    Collection Time: 12/03/17 10:22 AM   Result Value Ref Range    Magnesium 1 9 1 6 - 2 6 mg/dL   Basic metabolic panel    Collection Time: 12/03/17 10:22 AM   Result Value Ref Range    Sodium 139 136 - 145 mmol/L    Potassium 3 5 3 5 - 5 3 mmol/L    Chloride 108 100 - 108 mmol/L    CO2 23 21 - 32 mmol/L    Anion Gap 8 4 - 13 mmol/L    BUN 10 5 - 25 mg/dL    Creatinine 0 72 0 60 - 1 30 mg/dL    Glucose 143 (H) 65 - 140 mg/dL    Calcium 8 6 8 3 - 10 1 mg/dL    eGFR 104 ml/min/1 73sq m   Hemoglobin and hematocrit, blood    Collection Time: 12/04/17  6:53 AM   Result Value Ref Range    Hemoglobin 9 5 (L) 12 0 - 17 0 g/dL    Hematocrit 30 1 (L) 36 5 - 49 3 %       Radiology Results: I have personally reviewed pertinent films in PACS      Other Diagnostic Testing:   I have personally reviewed pertinent reports  Active Meds:   Current Facility-Administered Medications   Medication Dose Route Frequency    acetaminophen (TYLENOL) tablet 975 mg  975 mg Oral Q6H PRN    aluminum-magnesium hydroxide-simethicone (MYLANTA) 200-200-20 mg/5 mL oral suspension 15 mL  15 mL Oral Q4H PRN    baclofen tablet 10 mg  10 mg Oral TID    cefepime (MAXIPIME) 2,000 mg in dextrose 5 % 50 mL IVPB  2,000 mg Intravenous Q12H    enoxaparin (LOVENOX) subcutaneous injection 40 mg  40 mg Subcutaneous Daily    hydrocortisone 1 % cream   Topical 4x Daily PRN    HYDROmorphone (DILAUDID) 1 mg/mL injection 0 5 mg  0 5 mg Intravenous Q4H PRN    influenza inactivated quadrivalent vaccine (FLULAVAL) IM injection 0 5 mL  0 5 mL Intramuscular Prior to discharge    levothyroxine tablet 100 mcg  100 mcg Oral Early Morning    metroNIDAZOLE (FLAGYL) IVPB (premix) 500 mg  500 mg Intravenous Q8H    nicotine (NICODERM CQ) 21 mg/24 hr TD 24 hr patch 1 patch  1 patch Transdermal Daily    ondansetron (ZOFRAN) injection 4 mg  4 mg Intravenous Q4H PRN    oxyCODONE (ROXICODONE) immediate release tablet 10 mg  10 mg Oral Q4H PRN    sodium chloride 0 9 % infusion  100 mL/hr Intravenous Continuous         VTE Pharmacologic Prophylaxis: Enoxaparin (Lovenox)  VTE Mechanical Prophylaxis: sequential compression device    KARLIE Dunlap    Internal Medicine PGY-1  12/4/2017 8:05 AM  Pager [phone]

## 2017-12-04 NOTE — SOCIAL WORK
Initial interview and DC Dash:     CM met with the pt, his wife Zayda Pagan Bartolo Nuñez and Sarah Freeman at bedside to explain the CM role and discuss possible dc needs  The patient, his wife and his son speak [place] only; Araceli Mcallister speaks fluent [place] and assisted with CM intake  Pt lives in [facility], came to [place], [place] to attend his Son's wedding; he is currently living, with his wife, in his Son and DIL's home  It is a single story home in [place], [place]  [address]  Pt uses a tub shower  Pt is independent, retired and does not drive; his Son drives for him and his wife  No DME needed or avail in the home  No hx of VNA, IP rehab, Drug/ETOH or mental health  Prescriptions are filled at [facility] in [facility] or [address]  Pt is currently completing PATHS application d/t no insurance  Main contact: HUSSEIN Arenashouse [phone]    Admit Dx Sepsis, Abd inflammation/pain 2/2 Colon Cancer with Liver Mets  Pt will need assist with finances for medications  CM reviewed d/c planning process including the following: identifying help at home, patient preference for d/c planning needs, Discharge Lounge, [facility] Meds to Bed program, availability of treatment team to discuss questions or concerns patient and/or family may have regarding understanding medications and recognizing signs and symptoms once discharged  CM also encouraged patient to follow up with all recommended appointments after discharge  Patient advised of importance for patient and family to participate in managing patients medical well being

## 2017-12-04 NOTE — DISCHARGE INSTRUCTIONS
Cáncer colorrectal   LO QUE USTED DEBE SABER:   · El cáncer colorrectal comienza en el intestino grueso (el colon) o el recto  INSTRUCCIONES:   Medicamentos:   · Medicamentos antináuseas: Estos medicamentos pueden ser administrados para calmar thayer estómago y prevenir vómitos  · Analgésicos:  Es posible que le receten un medicamento para aliviar el dolor  No espere hasta que el dolor sea intenso antes de gustabo Junie  · [place] maya medicamentos melissa se le haya indicado  Llame a thayer proveedor de amirah si piensa que thayer medicamento no le está ayudando o tiene efectos secundarios  Infórmele si es alérgico a algún medicamento  Mantenga lobo lista de maya medicamentos, vitaminas, y hierbas que está tomando  Incluya la cantidad que vivien, la [place], y por qué las vivien  Traiga la lista o las botellas de las píldoras a maya visitas de seguimiento  Lleve siempre consigo lobo lista de maya medicamentos en remy de emergencia  Acuda a thayer fred de control con el oncólogo melissa se lo indicaron:  Bhumi las preguntas que tenga para que las recuerde aliza maya próximas citas  Cuidado propio:   · Consumir líquidos melissa le indicaron:  Pregunte cuánto líquido debe gustabo cada día y cuáles líquidos son los mejores para usted  Si usted tiene náuseas o diarrea debido al tratamiento contra el cáncer, aumentar los líquidos puede ayudar a disminuir thayer riesgo de deshidratación  · Consumir alimentos saludables:  Alimentos saludables incluyen frutas, verduras, pan integral, productos lácteos bajos en grasa, frijoles, carne magra y pescado  Brenda puede ayudarlo a sentirse mejor aliza el tratamiento y [employer] efectos secundarios  Es posible que necesite cambiar lo que come [facility]  No consuma alimentos o bebidas que le produzcan gases, melissa el repollo, los fríjoles, las cebollas o las gaseosas  Un nutricionista puede ayudarle a planificar los alimentos y bocadillos que más le [place] a usted    Comuníquese con cruz oncólogo sí:   · Usted tiene fiebre  · Usted no puede controlar la diarrea o el estreñimiento  · Usted vomita un montón de veces y no puede mantener nada de comida o líquidos en cruz estómago  · Cruz dolor es peor y no desaparece después de gustabo cruz medicamento para el dolor    · Usted nota elaina en maya evacuaciones intestinales  · Usted tiene preguntas o inquietudes sobre cruz condición o cuidado  Regrese a la antoni de emergencia sí:   · Cruz brazo o pierna se sienten calientes, sensibles, y dolorosos  Se podría julio cesar inflamado y medrano  · De súbito se siente mareado y le falta el aire  · Usted tiene dolor en el pecho cuando respira profundo o tose  Usted podría expectorar elaina  © 2014 [address] is for End User's use only and may not be sold, redistributed or otherwise used for commercial purposes  All illustrations and images included in CareNotes® are the copyrighted property of [employer]  or Kenrick Mayo  Esta información es sólo para uso en educación  Cruz intención no es darle un consejo médico sobre enfermedades o tratamientos  Colsulte con cruz Alben Lauri farmacéutico antes de seguir cualquier régimen médico para saber si es seguro y efectivo para usted  Please follow up with Hematologist/Oncologis in 1 week  Please have medical records with you

## 2017-12-06 LAB
BACTERIA BLD CULT: NORMAL
BACTERIA BLD CULT: NORMAL

## 2017-12-06 NOTE — DISCHARGE SUMMARY
[facility] [place] CENTRAL Discharge Summary - [address] 65 y o  male MRN: [id number]    Melissa 92  Room / Bed: [place] [address]/[place] [address] Encounter: [id number]    BRIEF OVERVIEW    Admitting Provider: Ministerio Lopez MD  Discharge Provider: Tracy Case MD  Primary Care Physician at Discharge: Refugio Anderson, [address] [place] [address]  Admission Date: 12/1/2017     Discharge Date: 12/4/2017  Juan Pablo Soto 18 is a 65year old male with past medical history of hypothyroidism, tobacco dependence, stage 4 colon cancer status post hemicolectomy with metastasis to the liver presented to the emergency for evaluation of right upper quadrant pain and vomiting  On admission, vitals were heart rate 124, temperature 99 8, respiration 20, blood pressure 114/62, saturating 89 percent room air  Laboratory results revealed hyponatremia, mildly elevated AST, elevated INR, hemoglobin 8 7 and leukocytosis  Lactic acid was 1 9  Patient met sepsis criteria and intravenous fluids were initiated  Blood cultures were drawn x2 and patient was started on intravenous cefepime and Flagyl for broad-spectrum coverage  CT abdomen and pelvis showed abnormal appearance of the gallbladder suspicious for acute cholecystitis, liver consistent with metastatic disease, mild hepatomegaly and abnormal appearing loops of small bowel in the right upper quadrant  Patient was admitted for further management  X-ray did not reveal any cardiopulmonary abnormality however there is an indeterminate 13 mm lucency in the right humeral head  Patient was placed NPO and surgery consult was placed for further evaluation of acute cholecystitis  Right upper quadrant sound was ordered however did not show any sonographic evidence of cholecystitis  Per General surgery, there was no evidence of acute cholecystitis and there were no indications for any immediate surgical management    Patient was given intravenous Dilaudid and oxycodone for pain and symptomatic treatment with intravenous Zofran  Hemoglobin initially on admission was 9 7 however trended down to 6 8  Patient required 2 units of packed RBCs and hemoglobin stabilized  Abdominal pain was most likely related to inflammatory response from colon cancer with metastasis to the liver  Patient clinically improved with resolution of abdominal pain and was tolerating oral diet and was stable upon discharge  Presenting Problem/History of Present Illness  Principal Problem:    [facility]eps[facility] ([facility] )  Active Problems:    Colon cancer metastasized to liver ([facility] )    Anemia    Leukocytosis  Resolved Problems:    Cholecystitis    Sepsis  On admission, patient temp 102 8°,  heart rate 118 and leukocytosis -met sepsis criteria  Initially, source was presumed to be from acute cholecystitis  Chest x-ray showed no intrathoracic pathology  Lactic acid was 1 9  Blood cultures were negative  Intravenous fluids initiated and then transition to p o  Diet  Was hemodynamically stable at discharge  Received a 1 time dose of vancomycin cefepime in the emergency room  Started on intravenous cefepime and Flagyl, however DC upon discharge due to the symptoms were likely related to overwhelming inflammatory response secondary to his liver metastasis       Acute Cholecystitis  CT of the abdomen and pelvis showed a normal appearance of gallbladder  Right upper quadrant ultrasound showed no sonographic evidence of cholecystitis or acute cholecystitis  There is trace ascites adjacent to the gallbladder and numerous lesions were present in the liver, likely known metastatic disease from the colon  Surgery consult placed and patient was maintained NPO  Surgery evaluate the patient and felt the patient was not having an episode of acute cholecystitis given the RUQ U/S, and was not a surgical candidate secondary to his metastatic disease of the liver    Right upper quadrant pain was most likely related inflammatory response secondary to his extensive metastatic disease to the liver  Intravenous Dilaudid and Oxycodone for pain as needed  Continue IV Cefepime and Flagyl however discontinued upon discharge  Symptomatic treatment with intravenous Zofran  Patient had a medically improved and was transitioned to low-fiber/low residue diet       Stage IV Colon Cancer with Liver Metastasis  Diagnosed in September 2014 with stage 4 Colon cancer with metastasis to the liver  Status post hemicolectomy and ostomy bag with a anastomosis  Status post chemotherapy for 3 weeks with Erbitox, FOLFIRI,   Currently not on any chemotherapy given patient's visiting from [address] up with Hematologist/Oncologist Gloria Milligan in [facility]    CT abdomen and pelvis-Diffuse abnormal appearance of the liver compatible with metastatic disease  Currently AST 81, ALT 18, PTT 16 2, INR 1 29     Hypothyroidism  Continue Synthroid 100 mcg       Elevated Alkaline Phosphatase    GGT was 1201  Right Humeral Head lesion  Patient a 13 mm lucency in the right humeral head found on chest x-ray  Will need outpatient follow-up for this  Diagnostic Procedures Performed  Imaging Studies:    Xr Chest 2 Views    Result Date: 12/1/2017  Impression: No acute cardiopulmonary abnormality seen  Limited inspiration  Indeterminate 13 mm lucency in the right humeral head  Consider dedicated workup  Workstation performed: [id number]     Us Right Upper Quadrant    Result Date: 12/1/2017  Impression: 1  Numerous lesions throughout the liver in keeping with known metastatic disease  Mild hepatomegaly also present  2   No sonographic evidence of cholelithiasis or acute cholecystitis  3   Trace ascites adjacent to the gallbladder fossa  Workstation performed: [id number]     Ct Abdomen Pelvis With Contrast    Result Date: 12/1/2017  Impression: 1    Abnormal appearance of the gallbladder, suspicious for acute cholecystitis  Consider follow-up nuclear medicine HIDA scan to rule out the presence of cystic duct obstruction  2   Diffuse abnormal appearance of the liver, most compatible with metastatic disease  3   Abnormal appearing loops of small bowel in the right upper quadrant, likely reactive ileus    I personally discussed this study with CHELA CARTY on 12/1/2017 1:13 PM       Pertinent Labs:      Results from last 7 days  Lab Units 12/03/17  1022 12/02/17  0916 12/01/17  1123 12/01/17  1109   SODIUM mmol/L 139 138  --  133*   POTASSIUM mmol/L 3 5 3 3*  --  3 7   CHLORIDE mmol/L 108 108  --  100   CO2 mmol/L 23 20*  --  25   BUN mg/dL 10 8  --  7   CREATININE mg/dL 0 72 0 59*  --  0 70   CALCIUM mg/dL 8 6 8 0*  --  9 0   TOTAL PROTEIN g/dL  --  6 3*  --  7 9   BILIRUBIN TOTAL mg/dL  --  0 72  --  0 98   ALK PHOS U/L  --  268*  --  390*   ALT U/L  --  12  --  18   AST U/L  --  85*  --  81*   GLUCOSE RANDOM mg/dL 143* 74  --  92   GLUCOSE, ISTAT mg/dl  --   --  98  --        Therapeutic Procedures Performed  N/A    Test Results Pending at Discharge: N/A    Medications     Medication List to be Continued at Discharge  Discharge Medication List as of 12/4/2017  4:00 PM        Discharge Medication List as of 12/4/2017  4:00 PM      START taking these medications    Details   acetaminophen (TYLENOL) 325 mg tablet Take 2 tablets by mouth every 6 (six) hours as needed for mild pain, Starting Mon 12/4/2017, Print      melatonin 3 mg Take 1 tablet by mouth daily at bedtime, Starting Mon 12/4/2017, Print      nicotine (NICODERM CQ) 21 mg/24 hr TD 24 hr patch Place 1 patch on the skin daily, Starting Tue 12/5/2017, Print           Discharge Medication List as of 12/4/2017  4:00 PM      STOP taking these medications       famotidine (PEPCID) 20 mg tablet Comments:   Reason for Stopping:         oxyCODONE-acetaminophen (PERCOCET) 5-325 mg per tablet Comments:   Reason for Stopping:               Allergies  Allergies   Allergen Reactions    Mercury     Shrimp (Diagnostic)      Discharge Diet: Low fiber/Low residue  Activity restrictions: none  Discharge Condition: stable  Discharged With Lines: no    Discharge Disposition: [facility] [address] / Family Member Name: Fracisco Delgadillo  Phone Number: [phone]    Outpatient Follow-Up  yes      Follow up: Dr Tho Alegre  Date and time: Call for appointment  Follow up within next: 2 weeks      Follow up: [facility]  Date and time: Call for appointment  Follow up within next: 2 weeks          Code Status: Prior  Advance Directive and Living Will: <no information>  Power of :    POLST:      Discharge  Statement   I spent 30 minutes minutes discharging the patient  This time was spent on the day of discharge  I had direct contact with the patient on the day of discharge  Additional documentation is required if more than 30 minutes were spent on discharge

## 2018-02-07 NOTE — CASE MANAGEMENT
Notification of Inpatient Admission/Inpatient Authorization Request  This is a Notification of Inpatient Admission/Request for Inpatient Authorization to our facility [facility]  Please be advised that this patient is currently in our facility under Inpatient Status  Below you will find the Attending Physician and Facilitys information including NPI# and contact information for the Utilization  assigned to the [facility] where the patient is receiving services  Please feel free to contact the Utilization Review Department with any questions  Patient Information:  PATIENT NAME: Marga Moncada  MRN: [id number]  YOB: 1960    PRESENTATION DATE: 12/1/2017 10:46 AM  IP ADMISSION DATE: 12/1/17 1351  DISCHARGE DATE: 12/4/2017  5:18 PM   DISPOSITION: Home/Self Care    Attending Physician:  KARLIE Escobedo  Specialty- Internal Medicine  Medicare Number-   Medicaid Number -   UPIN Number - [id number]  [facility] Practioner ID- [id number]     Primary Office:  [address] [place] [address]  Phone 1: [phone]  Phone 2:   Fax: [phone]     Arleen Brothers RN Registered Nurse Signed   Case Management Date of Service: 12/3/2017  3:05 PM         []Hide copied text  [address] [facility] in the [facility] by Kenrick Mayo for 2017  Network Utilization Review Department  Phone: [phone]; Fax [phone]  ATTENTION: The Network Utilization Review Department is now centralized for our 7 Facilities  Make a note that we have a new phone and fax numbers for our Department  Please call with any questions or concerns to [phone] and carefully follow the prompts so that you are directed to the right person  All voicemails are confidential  Fax any determinations, approvals, denials, and requests for initial or continue stay review clinical to [phone]   Due to HIGH CALL volume, it would be easier if you could please send faxed requests to expedite your requests and in part, help us provide discharge notifications faster      Initial Clinical Review     Admission: Date/Time/Statement: 12/1/17 @ 1351            Orders Placed This Encounter   Procedures    Inpatient Admission (expected length of stay for this patient is greater than two midnights)       Standing Status:   Standing       Number of Occurrences:   1       Order Specific Question:   Admitting Physician       Answer:   Jennifer Snider       Order Specific Question:   Level of Care       Answer:   Med Surg [16]       Order Specific Question:   Estimated length of stay       Answer:   More than 2 Midnights       Order Specific Question:   Certification       Answer:   I certify that inpatient services are medically necessary for this patient for a duration of greater than two midnights  See H&P and MD Progress Notes for additional information about the patient's course of treatment       ED: Date/Time/Mode of Arrival:             ED Arrival Information      Expected Arrival Acuity Means of Arrival Escorted By Service Admission Type     - 12/1/2017 10:13 Emergent Walk-In Family Member General Medicine Emergency     Arrival Complaint     Abdominal Pain          Chief Complaint:        Chief Complaint   Patient presents with    Abdominal Pain       history of colon cancer, recently arrived from [place] , hasnt had any chemo for 4 weeks, + nausea and vomiting      History of Illness: Alicia Florentino a 65 y  o  male the past medical history of hypothyroidism, stage IV colon cancer with metastasis to the liver (diagnosed in 2014) status post hemicolectomy with an ostomy bag with reanastomosis, currently not on chemotherapy presented to the emergency room for evaluation of abdominal pain   Patient is currently visiting from [facility] for the last 3 weeks   Approximately 3 weeks ago patient was hospitalized for acute diverticulitis which required intravenous fluids and antibiotics   Patient was discharged with Augmentin in a stable state  Maria T Thurman he says abdominal pain is chronic in nature and waxes and wanes   Throughout the last 2 weeks patient says that he has had progressive malaise, decreased appetite and subjective fevers and chills worsening these last couple days  This morning patient had severe abdominal pain, nausea and 1 episode of nonbloody vomiting and presented to the emergency room for evaluation  Wisnome Maguire says the abdominal pain is located mainly in the right upper quadrant, described as sharp and worsened after meals   Pain describes the pain as sharp and radiates to his right shoulder  Patient takes Percocet from his previous hospitalization, which alleviates the pain  Patient admits to subjective weight loss however has not measured his weight    Patient is a current an everyday smoker since the age of 21yr, approximately 15 cigarettes per day  Winsome Maguire currently does not drink alcohol   Patient follows up with hematologist and oncologist Jeet Gunter last dose of chemotherapy was 3 weeks ago       ED Vital Signs:            ED Triage Vitals   Temperature Pulse Respirations Blood Pressure SpO2   12/01/17 1026 12/01/17 1026 12/01/17 1026 12/01/17 1026 12/01/17 1026   99 8 °F (37 7 °C) (!) 124 20 114/62 99 %       Temp Source Heart Rate Source Patient Position - Orthostatic VS BP Location FiO2 (%)   12/01/17 1026 12/01/17 1146 12/01/17 1146 12/03/17 0003 --   Oral Monitor Lying Right arm         Pain Score           12/01/17 1026           8            Wt Readings from Last 1 Encounters:   12/01/17 61 3 kg (135 lb 2 3 oz)      Vital Signs (abnormal):   12/03/17 1158   98 1 °F (36 7 °C)    116   18   132/72   100 %   None (Room air)   --   12/02/17 2348   99 °F (37 2 °C)    109   18   117/65   --   --   --   12/02/17 2239   100 3 °F (37 9 °C)    126   20   121/68   96 %   --   --   12/02/17 1900    103 1 °F (39 5 °C)   127   20   144/68   98 %   --   --   12/02/17 1500   98 6 °F (37 °C)    108   20   118/70   100 %   --   --   12/02/17 0831    100 8 °F (38 2 °C)    113   18   130/68   97 %   None (Room air)   --   12/01/17 2342   99 °F (37 2 °C)    106   18   123/70   97 %   --   --   12/01/17 2339   99 °F (37 2 °C)    106   18   123/70   97 %   --   --   12/01/17 1730   99 2 °F (37 3 °C)    106   18   138/83   99 %   None (Room air)   Lying   12/01/17 1308    101 1 °F (38 4 °C)   --   --   --   --   --   --   12/01/17 1300   --    111   20   124/68   97 %   None (Room air)   Lying   12/01/17 1146   --    114   20   157/70   99 %   --   Lying   12/01/17 1100    102 8 °F (39 3 °C)    118   21   151/70   --   --   --   12/01/17 1026   99 8 °F (37 7 °C)    124   20   114/62   99 %   --   --      Abnormal Labs:    12/1   12/2 12/3   Sodium 133      138    139      Potassium 3 7   3 3 3 5   CO2 25   20 23   Creatinine 0 70   0 59 0 72   Glucose 92   74 143   Calcium 9 0   8 0 8 6   AST 81   85     Alkaline Phosphatase 390   268     Total Protein 7 9   6 3     Albumin 2 7   2 0     Phosphorus     2 0 1 4   CHLORIDE, ISTAT   98       GGT 1,201               WBC 19 50    18 51    21 00   RBC 3 74 2 89 4 17   Hemoglobin 8 7 6 8 10 2   Hematocrit 28 4 21 9 32 0   MCV 76 76 77   MCH 23 3 23 5 24 5   MCHC 30 6 31 1 31 9   RDW 19 8 19 8 17 9   MPV 9 5 8 8 9 7      PT/INR 16 2/1 29  PTT 47  GGT 1201  Protein, UA Negative mg/dl 30 (1+)     Urobilinogen, UA 0 2, 1 0 E U /dl E U /dl 2 0     Bilirubin, UA Negative  Interference- unable to analyze     Blood cultures pending     Diagnostic Test Results:      CT abd, pelvis - 1   Abnormal appearance of the gallbladder, suspicious for acute cholecystitis   Consider follow-up nuclear medicine HIDA scan to rule out the presence of cystic duct obstruction  2   Diffuse abnormal appearance of the liver, most compatible with metastatic disease     3   Abnormal appearing loops of small bowel in the right upper quadrant, likely reactive ileus      CXR - Indeterminate 13 mm lucency in the right humeral head   Consider dedicated workup      US RUQ - 1   Numerous lesions throughout the liver in keeping with known metastatic disease   Mild hepatomegaly also present  2   No sonographic evidence of cholelithiasis or acute cholecystitis  3   Trace ascites adjacent to the gallbladder fossa      ED Treatment:           Medication Administration from 12/01/2017 1013 to 12/01/2017 1616    Date/Time Order Dose Route Action   12/01/2017 1120 sodium chloride 0 9 % bolus 1,000 mL 1,000 mL Intravenous New Bag   12/01/2017 1120 HYDROmorphone (DILAUDID) 1 mg/mL injection 0 5 mg 0 5 mg Intravenous Given   12/01/2017 1121 ondansetron (ZOFRAN) injection 4 mg 4 mg Intravenous Given   12/01/2017 1114 acetaminophen (TYLENOL) rectal suppository 650 mg 650 mg Rectal Not Given   12/01/2017 1127 acetaminophen (TYLENOL) tablet 650 mg 650 mg Oral Given   12/01/2017 1318 metroNIDAZOLE (FLAGYL) IVPB (premix) 500 mg 500 mg Intravenous New Bag   12/01/2017 1430 vancomycin (VANCOCIN) IVPB (premix) 1,000 mg 1,000 mg Intravenous New Bag   12/01/2017 1301 cefepime (MAXIPIME) IVPB (premix) 2,000 mg 2,000 mg Intravenous New Bag   12/01/2017 1157 sodium chloride 0 9 % bolus 1,000 mL 1,000 mL Intravenous New Bag   12/01/2017 1237 iohexol (OMNIPAQUE) 350 MG/ML injection (MULTI-DOSE) 100 mL 100 mL Intravenous Given   12/01/2017 1553 HYDROmorphone (DILAUDID) 1 mg/mL injection 0 5 mg 0 5 mg Intravenous Given          Past Medical/Surgical History:         Active Ambulatory Problems     Diagnosis Date Noted    No Active Ambulatory Problems           Resolved Ambulatory Problems     Diagnosis Date Noted    No Resolved Ambulatory Problems           Past Medical History:   Diagnosis Date    Cancer [facility])      Colon cancer metastasized to liver ([facility] [address] )      Disease of thyroid gland        Admitting Diagnosis: Cholecystitis [K81 9]  Abdominal pain [R10 9]  Sepsis ([facility] ) [A41 9]     Age/Sex: 65 y o  male     Assessment/Plan:   Sepsis  On admission, patient temp 102 8°,  heart rate 118 and leukocytosis -met sepsis criteria   Source likely from acute cholecystitis   Chest x-ray showed no intrathoracic pathology, UA   Lactic acid 1 9  Blood cultures x 2 pending  Intravenous fluids initiated  Current hemodynamically stable  Received a 1 time dose of vancomycin cefepime in the emergency room  Currently on intravenous cefepime and Flagyl      Acute Cholecystitis  CT of the abdomen and pelvis showed a normal appearance of gallbladder   Right upper quadrant ultrasound showed no sonographic evidence of cholecystitis or acute cholecystitis  There is trace ascites adjacent to the gallbladder and numerous  Surgery consult placed   Maintain NPO     Likely not a surgical candidate secondary to his metastatic disease of the liver  Intravenous Dilaudid and Oxycodone for pain as needed  Continue IV Cefepime and Flagyl  Symptomatic treatment with intravenous Zofran  CMP in the am     Stage IV Colon Cancer with Liver Metastasis  Diagnosed in September 2014 with stage 4   Status post hemicolectomy and ostomy bag with a anastomosis  Status post chemotherapy for 3 weeks with Erbitox, FOLFIRI,   Currently not on any chemotherapy   Follows up with Hematologist/Oncologist Heydi Dias in [facility]  CT abdomen and pelvis-Diffuse abnormal appearance of the liver compatible with metastatic disease  Currently AST 81, ALT 18, PTT 16 2, INR 1 29     Hypothyroidism  Continue Synthroid 100 mcg     Elevated Alkaline Phosphatase    CMP in the morning   GGT pending  Code Status: Level 3 - DNAR/DNI  VTE Pharmacologic Prophylaxis: Sequential compression device (Venodyne)  and Enoxaparin (Lovenox)   VTE Mechanical Prophylaxis: sequential compression device  Admission Status: INPATIENT      Admission Orders:  Scheduled Meds:   baclofen 10 mg Oral TID   cefepime 2,000 mg Intravenous Q12H enoxaparin 40 mg Subcutaneous Daily   levothyroxine 100 mcg Oral Early Morning   metroNIDAZOLE 500 mg Intravenous Q8H   nicotine 1 patch Transdermal Daily      Continuous Infusions:   sodium chloride 100 mL/hr Last Rate: Stopped (12/03/17 0201)      PRN Meds:   Acetaminophen x3    aluminum-magnesium hydroxide-simethicone x2    hydrocortisone    HYDROmorphone x 6 since admission    influenza vaccine    Ondansetron x1    oxyCODONE 8 since admission     Tele  SCDs  Up with assist  VS q 3 hrs with transfusion  Diet Gi, lo fiber, lo residue  Cons GI  Cons Surgery   _______________________________  12/1 Surgery Consult  72-year-old male with:   1  Abdominal pain and fever likely secondary to acute cholecystitis              - Keep NPO except for sips with meds for now               - IV fluid resuscitation               - Broad-spectrum IV antibiotics              - Follow-up blood cultures               - Consider percutaneous cholecystostomy drainage if failure to respond to conservative management with antibiotics              - Based on patient's history of stage IV colon cancer with significant hepatic metastatic disease burden, the patient is not a candidate for cholecystectomy at this time               - P r n  Analgesia      2   Sepsis likely secondary to #1               - Broad-spectrum IV antibiotics              - Follow-up blood cultures               - Management of cholecystitis as above      3   Stage IV colon cancer               - Management per medical service    __________________________________  12/2 Medicine Progress Note  Principal Problem:    Sepsis ([facility] )  Active Problems:    Cholecystitis    Colon cancer metastasized to liver ([facility] )    Anemia    Leukocytosis     Sepsis secondary to acute cholecystitis:              -we will continue with Flagyl and cefepime at this point   Today is antibiotic day 2   He continues to spike fever this morning               -blood cultures are pending               -[facility] upper quadrant ultrasound, chest x-ray, and CT abdomen and pelvis all reviewed               -WBC count 18 51               -I will continue his intravenous fluids              -follow-up blood culture      Anemia: Hgb 6 8 this morning  Decreased from 9 7 on admission    Елена Pagan is unknown [place]               -will transfuse with 2 unit PRBC, consent obtained              -etiology is likely multifactorial due to anemia of chronic disease given his history of colon cancer, but will also evaluate for FOBT     Acute cholecystitis:              -surgical input appreciated   No surgical intervention planned at this time               -continue with pain control as ordered      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:              -Diagnosed in September 2014 with stage 4   Status post hemicolectomy and ostomy bag with a anastomosis  Status post chemotherapy for 3 weeks with Erbitox, FOLFIRI,   Currently not on any chemotherapy   Follows up with Hematologist/Oncologist Cecile Mcmahon in [facility]     Hypothyroidism:  Continue with Synthroid     Elevated alkaline phosphatase:  This is likely secondary to his colon cancer with liver metastasis, versus may be related to possible osteolytic lesion in the right humeral head, see below, however the etiology of this lesion is indeterminate and will require further follow-up      R Humeral head lesion:  Patient has a 13 mm lucency in the right humeral head   He will need follow-up for this                -he will need follow-up after acute stage of his illness here   _______________________________________   12/2 Surgery Progress Note  Assessment:  65y o -year-old male with abdominal pain and stage 4 colon cancer w/ liver metastases     Plan:  - we feel that this patient has no evidence of gallbladder disease  - at this time, we feel that his pain and distress is directly attributable to his advanced colon cancer with liver metastases  - would offer him regular diet  - surgery will sign off but is available if needed            Thank you,  [address] [facility] in the [facility] by Kenrick Mayo for 2017  Network Utilization Review Department  Phone: [phone]; Fax [phone]  ATTENTION: The Network Utilization Review Department is now centralized for our 7 Facilities  Make a note that we have a new phone and fax numbers for our Department  Please call with any questions or concerns to [phone] and carefully follow the prompts so that you are directed to the right person  All voicemails are confidential  Fax any determinations, approvals, denials, and requests for initial or continue stay review clinical to [phone]  Due to HIGH CALL volume, it would be easier if you could please send faxed requests to expedite your requests and in part, help us provide discharge notifications faster